# Patient Record
Sex: FEMALE | Race: WHITE | NOT HISPANIC OR LATINO | URBAN - METROPOLITAN AREA
[De-identification: names, ages, dates, MRNs, and addresses within clinical notes are randomized per-mention and may not be internally consistent; named-entity substitution may affect disease eponyms.]

---

## 2021-04-08 DIAGNOSIS — Z23 ENCOUNTER FOR IMMUNIZATION: ICD-10-CM

## 2024-01-05 ENCOUNTER — EVALUATION (OUTPATIENT)
Dept: PHYSICAL THERAPY | Facility: CLINIC | Age: 69
End: 2024-01-05
Payer: MEDICARE

## 2024-01-05 VITALS — DIASTOLIC BLOOD PRESSURE: 80 MMHG | SYSTOLIC BLOOD PRESSURE: 115 MMHG

## 2024-01-05 DIAGNOSIS — M25.572 LEFT ANKLE PAIN, UNSPECIFIED CHRONICITY: Primary | ICD-10-CM

## 2024-01-05 PROCEDURE — 97161 PT EVAL LOW COMPLEX 20 MIN: CPT

## 2024-01-05 PROCEDURE — 97110 THERAPEUTIC EXERCISES: CPT

## 2024-01-05 NOTE — PROGRESS NOTES
PT Evaluation     Today's date: 2024  Patient name: Lilian Tamayo  : 1955  MRN: [id number]  Referring provider: Suhail Mckay *  Dx:   Encounter Diagnosis     ICD-10-CM    1. Left ankle pain, unspecified chronicity  M25.572           Start Time: 0900  Stop Time: 0945  Total time in clinic (min): 45 minutes    Assessment  Assessment details: Lilian is a 69 yo male presenting to OP PT secondary to hx of L lateral malleoli avulsion fracture which occurred on 23. She is currently in lace up ankle brace and WBAT, with orders to slowly wean out with PT. Pt demonstrates L ankle AROM deficits, decreased strength, gait deviations, and balance impairments. Pt was given HEP which was completed and reviewed during treatment session. Pt would benefit from skilled PT to address impairments and improve gait mechanics as well as tolerance to PLOF. Pt is in agreement with POC.   Impairments: abnormal or restricted ROM, activity intolerance and pain with function    Symptom irritability: moderateUnderstanding of Dx/Px/POC: good   Prognosis: good    Goals  1. Pt will demonstrate a decrease pain by 50% in 4 weeks to improve tolerance with prolonged standing/walking (currently 2-9/10)   2. Pt will Increase  lower extremity strength golbally to 4/5 in 6 weeks to improve tolerance to amb >20 minutes per PLOF  3. Pt will demonstrate tolerance to maintaining SLS to 30 sec to decrease imbalance with amb  4. Pt will demonstrate 10 deg improvement in ankle DF AROM to allow for improved ease with squatting, stair climbing, and normalize gait mechanics    1. Return to Prior Level of Function in 4-6 weeks  2. Pt will demonstrate Hughes with progressive home exercise program to improve carryover and decrease recurrence of symptoms.  3. Pt will demonstrate 20% improvement in FOTO score to increase tolerance to functional activities   4. Pt will demonstrate 4+/5 in LE strength to allow for improved tolerance to  prolonged amb/standing in 6-8 weeks        Plan  Patient would benefit from: PT eval and skilled physical therapy  Planned modality interventions: TENS, manual electrical stimulation, thermotherapy: hydrocollator packs and cryotherapy  Planned therapy interventions: IASTM, joint mobilization, manual therapy, massage, patient education, neuromuscular re-education, strengthening, stretching, therapeutic activities, therapeutic exercise, balance and gait training  Frequency: 2x week  Duration in weeks: 8  Plan of Care beginning date: 2024  Plan of Care expiration date: 3/6/2024  Treatment plan discussed with: patient      Subjective Evaluation    History of Present Illness  Date of onset: 2023  Mechanism of injury: trauma  Mechanism of injury: Lilian is a 68-year-old female presenting to OP PT fracture which occurred on 23, pt was in CAM boot for 6 weeks and is currently in lace up brace until follow up with MD in 3-4 weeks. She had follow up radiograph and clinical evaluation for her left ankle lateral malleolus avulsion fracture. Per MD was instructed to and may begin transition away from the Raptor brace when she feels as though she is ready. Pt reports she is able to amb around her house however, with some pain after 1 hour before taking a break. She reports brace has been rubbing against outside of her ankle. Pt reports she has majority of her pain with prolonged standing. She reports she tries to ascend stairs normally continues to have difficulty descending due to brace.           Recurrent probem    Quality of life: good    Patient Goals  Patient goals for therapy: increased strength, independence with ADLs/IADLs and decreased pain  Patient goal: Walk without brace  Pain  Current pain ratin  At best pain ratin  At worst pain ratin  Quality: dull ache  Relieving factors: medications, rest, support and ice  Aggravating factors: standing, walking and stair climbing  Progression: no  change    Social Support  Steps to enter house: yes  Stairs in house: yes   Lives in: multiple-level home  Lives with: spouse    Employment status: not working  Hand dominance: right    Treatments  Previous treatment: medication and immobilization        Objective     Observations   Left Ankle/Foot   Positive for edema.     Palpation   Left   Tenderness of the anterior tibialis and peroneus.     Tenderness   Left Ankle/Foot   Tenderness in the anterior talofibular ligament and lateral malleolus.     Neurological Testing     Sensation     Ankle/Foot   Left Ankle/Foot   Intact: light touch    Active Range of Motion   Left Ankle/Foot   Dorsiflexion (ke): 8 degrees with pain  Plantar flexion: 29 degrees   Inversion: 10 degrees   Eversion: 10 degrees     Passive Range of Motion   Left Ankle/Foot    Dorsiflexion (ke): 10 degrees with pain  Plantar flexion: 30 degrees with pain  Inversion: 30 degrees with pain  Eversion: 20 degrees with pain    Strength/Myotome Testing     Right Ankle/Foot   Dorsiflexion: 4+  Plantar flexion: 4+  Eversion: 4+  Great toe flexion: 4+  Great toe extension: 4+    Additional Strength Details  Not assessed due to lack of full motion     Ambulation     Observational Gait   Gait: asymmetric   Decreased walking speed, left stance time and left step length.   Left foot contact pattern: foot flat      Flowsheet Rows      Flowsheet Row Most Recent Value   PT/OT G-Codes    Current Score 49   Projected Score 67               Precautions: History reviewed. No pertinent past medical history.      Date 1/05           Visit # 1           FOTO done           Re-eval                  Manuals 1/05                                                                Neuro Re-Ed             Foam WS             Rocker board A/P, M/L             SLS             BAPs                                                     Ther Ex 1/05            Bike             Ankle AROM DF/PF, EV/INV 2x10            Ankle ABCs              4 way cristal             Ankle circles X10 HEP            Seated HR/TR             Arch raises             Short foot             Standing SLR 3 way              CC EV/INV             Ther Activity             Step ups             Squat             Gait Training                                       Modalities

## 2024-01-05 NOTE — LETTER
2024      No Recipients    Patient: Lilian Tamayo   YOB: 1955   Date of Visit: 2024     Encounter Diagnosis     ICD-10-CM    1. Left ankle pain, unspecified chronicity  M25.572           Dear Dr. Mckay:    Thank you for your recent referral of Lilian Tamayo. Please review the attached evaluation summary from Lilian's recent visit.     Please verify that you agree with the plan of care by signing the attached order.     If you have any questions or concerns, please do not hesitate to call.     I sincerely appreciate the opportunity to share in the care of one of your patients and hope to have another opportunity to work with you in the near future.       Sincerely,    Estephanie Schulte, PT      Referring Provider:      I certify that I have read the below Plan of Care and certify the need for these services furnished under this plan of treatment while under my care.                    Suhail Mckay II, PA-C  [address] [place] [address]  Via Fax: [phone]          PT Evaluation     Today's date: 2024  Patient name: Lilian Tamayo  : 1955  MRN: [id number]  Referring provider: Suhail Mckay *  Dx:   Encounter Diagnosis     ICD-10-CM    1. Left ankle pain, unspecified chronicity  M25.572           Start Time: 0900  Stop Time: 0945  Total time in clinic (min): 45 minutes    Assessment  Assessment details: Lilian is a 69 yo male presenting to OP PT   Impairments: abnormal or restricted ROM, activity intolerance and pain with function    Symptom irritability: moderateUnderstanding of Dx/Px/POC: good   Prognosis: good    Goals  1. Pt will demonstrate a decrease pain by 50% in 4 weeks to improve tolerance with prolonged standing/walking  2. Pt will Increase  lower extremity strength golbally to 4/5 in 6 weeks to improve tolerance to amb >20 minutes per PLOF  3. Pt will demonstrate tolerance to maintaining SLS to 30 sec to decrease imbalance with  amb  4. Pt will demonstrate 10 deg improvement in ankle DF AROM to allow for improved ease with squatting, stair climbing, and normalize gait mechanics    1. Return to Prior Level of Function in 4-6 weeks  2. Pt will demonstrate Cook with progressive home exercise program to improve carryover and decrease recurrence of symptoms.  3. Pt will demonstrate 20% improvement in FOTO score to increase tolerance to functional activities   4. Pt will demonstrate 4+/5 in LE strength to allow for improved tolerance to prolonged amb/standing in 6-8 weeks        Plan  Patient would benefit from: PT eval and skilled physical therapy  Planned modality interventions: TENS, manual electrical stimulation, thermotherapy: hydrocollator packs and cryotherapy  Planned therapy interventions: IASTM, joint mobilization, manual therapy, massage, patient education, neuromuscular re-education, strengthening, stretching, therapeutic activities, therapeutic exercise, balance and gait training  Frequency: 2x week  Duration in weeks: 8  Plan of Care beginning date: 1/5/2024  Plan of Care expiration date: 3/6/2024  Treatment plan discussed with: patient    Subjective Evaluation    History of Present Illness  Date of onset: 11/19/2023  Mechanism of injury: trauma  Mechanism of injury: Lilian is a 68-year-old female presenting to OP PT fracture which occurred on 11/19/23, pt was in CAM boot for 6 weeks and is currently in lace up brace until follow up with MD in 3-4 weeks. She had follow up radiograph and clinical evaluation for her left ankle lateral malleolus avulsion fracture. Per MD was instructed to and may begin transition away from the Raptor brace when she feels as though she is ready. Pt reports she is able to amb around her house however, with some pain after 1 hour before taking a break. She reports brace has been rubbing against outside of her ankle. Pt reports she has majority of her pain with prolonged standing. She reports she  tries to ascend stairs normally continues to have difficulty descending due to brace.           Recurrent probem    Quality of life: good    Patient Goals  Patient goals for therapy: increased strength, independence with ADLs/IADLs and decreased pain  Patient goal: Walk without brace  Pain  Current pain ratin  At best pain ratin  At worst pain ratin  Quality: dull ache  Relieving factors: medications, rest, support and ice  Aggravating factors: standing, walking and stair climbing  Progression: no change    Social Support  Steps to enter house: yes  Stairs in house: yes   Lives in: multiple-level home  Lives with: spouse    Employment status: not working  Hand dominance: right    Treatments  Previous treatment: medication and immobilization      Objective     Observations   Left Ankle/Foot   Positive for edema.     Palpation   Left   Tenderness of the anterior tibialis and peroneus.     Tenderness   Left Ankle/Foot   Tenderness in the anterior talofibular ligament and lateral malleolus.     Neurological Testing     Sensation     Ankle/Foot   Left Ankle/Foot   Intact: light touch    Active Range of Motion   Left Ankle/Foot   Dorsiflexion (ke): 8 degrees with pain  Plantar flexion: 29 degrees   Inversion: 10 degrees   Eversion: 10 degrees     Passive Range of Motion   Left Ankle/Foot    Dorsiflexion (ke): 10 degrees with pain  Plantar flexion: 30 degrees with pain  Inversion: 30 degrees with pain  Eversion: 20 degrees with pain    Strength/Myotome Testing     Right Ankle/Foot   Dorsiflexion: 4+  Plantar flexion: 4+  Eversion: 4+  Great toe flexion: 4+  Great toe extension: 4+    Additional Strength Details  Not assessed due to lack of full motion     Ambulation     Observational Gait   Gait: asymmetric   Decreased walking speed, left stance time and left step length.   Left foot contact pattern: foot flat      Flowsheet Rows      Flowsheet Row Most Recent Value   PT/OT G-Codes    Current Score 49    Projected Score 67               Precautions: History reviewed. No pertinent past medical history.      Date 1/05           Visit # 1           FOTO done           Re-eval                  Manuals 1/05                                                                Neuro Re-Ed             Foam WS             Rocker board A/P, M/L             SLS             BAPs                                                     Ther Ex 1/05            Bike             Ankle AROM DF/PF, EV/INV 2x10            Ankle ABCs             4 way cristal             Ankle circles X10 HEP            Seated HR/TR             Arch raises             Short foot             Standing SLR 3 way              CC EV/INV             Ther Activity             Step ups             Squat             Gait Training                                       Modalities

## 2024-01-08 ENCOUNTER — OFFICE VISIT (OUTPATIENT)
Dept: PHYSICAL THERAPY | Facility: CLINIC | Age: 69
End: 2024-01-08
Payer: MEDICARE

## 2024-01-08 DIAGNOSIS — M25.572 LEFT ANKLE PAIN, UNSPECIFIED CHRONICITY: Primary | ICD-10-CM

## 2024-01-08 PROCEDURE — 97110 THERAPEUTIC EXERCISES: CPT

## 2024-01-08 PROCEDURE — 97140 MANUAL THERAPY 1/> REGIONS: CPT

## 2024-01-08 NOTE — PROGRESS NOTES
Daily Note     Today's date: 2024  Patient name: Lilian Tamayo  : 1955  MRN: [id number]  Referring provider: Suhail Mckay *  Dx:   Encounter Diagnosis     ICD-10-CM    1. Left ankle pain, unspecified chronicity  M25.572           Start Time: 725  Stop Time: 0808  Total time in clinic (min): 43 minutes    Subjective: pt reports mild elevation in ankle soreness with exercises, she verbalizes increase skin sensitivity around lateral malleoli. Pt denies any medical updates since her las session.       Objective: See treatment diary below      Assessment: Tolerated treatment well. Pt was introduced to standing exercises and intrinsic muscle strengthening this session. She reports some soreness with EV/INV ROM exercises but is able to complete. Patient exhibited good technique with therapeutic exercises and would benefit from continued PT      Plan: Continue per plan of care.      Precautions: History reviewed.     Date           Visit # 1           FOTO done           Re-eval                  Manuals            Ankle PROM to tolerance                                                    Neuro Re-Ed             Foam WS  1x10 A/P. M/L   1x10 SL            Rocker board A/P, M/L             SLS             BAPs                                                     Ther Ex            Bike             Ankle AROM DF/PF, EV/INV 2x10 2x10 ea           Ankle ABCs  1x ea           4 way cristal  MRE 5''x10 ea           Ankle circles X10 HEP 1x15           Supine   SLR 2x10            Seated HR/TR  1x15           Arch raises  1x15           Short foot             Standing SLR 3 way   1x10            Gastroc stretch   5x10''           CC EV/INV  1x10           Ther Activity             Step ups             Squat             Gait Training                                       Modalities

## 2024-01-12 ENCOUNTER — OFFICE VISIT (OUTPATIENT)
Dept: PHYSICAL THERAPY | Facility: CLINIC | Age: 69
End: 2024-01-12
Payer: MEDICARE

## 2024-01-12 DIAGNOSIS — M25.572 LEFT ANKLE PAIN, UNSPECIFIED CHRONICITY: Primary | ICD-10-CM

## 2024-01-12 PROCEDURE — 97140 MANUAL THERAPY 1/> REGIONS: CPT

## 2024-01-12 PROCEDURE — 97110 THERAPEUTIC EXERCISES: CPT

## 2024-01-12 PROCEDURE — 97112 NEUROMUSCULAR REEDUCATION: CPT

## 2024-01-12 NOTE — PROGRESS NOTES
Daily Note     Today's date: 2024  Patient name: Lilian Tamayo  : 1955  MRN: [id number]  Referring provider: Suhail Mckay *  Dx:   Encounter Diagnosis     ICD-10-CM    1. Left ankle pain, unspecified chronicity  M25.572           Start Time: 1250  Stop Time: 1330  Total time in clinic (min): 40 minutes    Subjective: Pt reports ankle is more sore at the start of her session due to putting away krys decorations. She denies any medical changes since her last session.        Objective: See treatment diary below      Assessment:  Lilian tolerated treatment well with consistent cuing throughout. TE's were performed with the same resistance and reps. New TE's were demonstrated with proper technique, and tolerated well. Following treatment, the patient demonstrated fatigue post treatment, exhibited good technique with therapeutic exercises, and would benefit from continued PT.         Plan: Continue per plan of care.      Precautions: History reviewed.     Date          Visit # 1           FOTO done           Re-eval                  Manuals           Ankle PROM to tolerance  FH FH          STM to lateral ankle   FH                                    Neuro Re-Ed             Foam WS  1x10 A/P. M/L   1x10 SL  1x10 w/ brace          Rocker board A/P, M/L             SLS             BAPs              Foam steps   Fwd/lat WS 1x10          Mini squats   1x10 on foam                       Ther Ex           Bike             Ankle AROM DF/PF, EV/INV 2x10 2x10 ea 3x10          Ankle ABCs  1x ea 1x          4 way cristal  MRE 5''x10 ea 5''x15          Ankle circles X10 HEP 1x15 2x10          Supine   SLR 2x10  SLR 2x10          Seated HR/TR  1x15 1x15 Inc 2x10         Arch raises  1x15 5''x15          Short foot             Standing SLR 3 way   1x10  1x10          Gastroc stretch   5x10'' 5x10''          CC EV/INV  1x10 1x10          Ther Activity             Step  ups             Squat             Gait Training                                       Modalities

## 2024-01-15 ENCOUNTER — OFFICE VISIT (OUTPATIENT)
Dept: PHYSICAL THERAPY | Facility: CLINIC | Age: 69
End: 2024-01-15
Payer: MEDICARE

## 2024-01-15 DIAGNOSIS — M25.572 LEFT ANKLE PAIN, UNSPECIFIED CHRONICITY: Primary | ICD-10-CM

## 2024-01-15 PROCEDURE — 97112 NEUROMUSCULAR REEDUCATION: CPT

## 2024-01-15 PROCEDURE — 97140 MANUAL THERAPY 1/> REGIONS: CPT

## 2024-01-15 PROCEDURE — 97110 THERAPEUTIC EXERCISES: CPT

## 2024-01-15 NOTE — PROGRESS NOTES
Daily Note     Today's date: 1/15/2024  Patient name: Lilian Tamaoy  : 1955  MRN: [id number]  Referring provider: Suhail Mckay *  Dx:   Encounter Diagnosis     ICD-10-CM    1. Left ankle pain, unspecified chronicity  M25.572           Start Time: 0730  Stop Time: 0810  Total time in clinic (min): 40 minutes    Subjective: Pt reports soreness which she notes is typical when she is on her feet for extended periods of time.      Objective: See treatment diary below      Assessment: Lilian tolerates treatment well. She is introduced to theraband restistance for 4 way ankle strengthening with good tolerance. Exercise reps were increased to further challenge patient. She was additionally introduced to gentle DF mobilization with movement off 4inch step with good tolernace and demonstration of increase mobility. She reports mild elevation in soreness, but denies significant discomfort. Pt was educated to ice and elevate to assist if soreness persists later in the day.       Plan: Continue per plan of care.      Precautions: History reviewed.     Date 1/05 1/08 1/12 1/15        Visit # 1           FOTO done           Re-eval                  Manuals 1/05 1/08 1/12 1/15         Ankle PROM to tolerance  FH FH FH         STM to lateral ankle   FH FH                                   Neuro Re-Ed             Foam WS  1x10 A/P. M/L   1x10 SL  1x10 w/ brace W/o brace 2x10         Rocker board A/P, M/L             SLS             BAPs              Foam steps   Fwd/lat WS 1x10 W/o brace 2x10         Mini squats   1x10 on foam 1x10 on foam w/o brace                      Ther Ex 1/05 1/08 1/12 1/15         Bike             Ankle AROM DF/PF, EV/INV 2x10 2x10 ea 3x10 3x10 ea DC to HEP         Ankle ABCs  1x ea 1x 1x         4 way cristal  MRE 5''x10 ea 5''x15 5''x15 MRE Tband GTB 2x10         Ankle circles X10 HEP 1x15 2x10 2x10         Supine SLR  SLR 2x10  SLR 2x10 2x10 SLR         Seated HR/TR  1x15 1x15  2x10          Arch raises  1x15 5''x15 2x10         Short foot             Standing SLR 3 way   1x10  1x10 On foam 1x15 ea         Gastroc stretch   5x10'' 5x10'' 3x15''         CC EV/INV  1x10 1x10 2x10 ea         Ther Activity             Step ups    Step overs with foam 1x15         Squat             Gait Training                                       Modalities

## 2024-01-19 ENCOUNTER — APPOINTMENT (OUTPATIENT)
Dept: PHYSICAL THERAPY | Facility: CLINIC | Age: 69
End: 2024-01-19
Payer: MEDICARE

## 2024-01-22 ENCOUNTER — OFFICE VISIT (OUTPATIENT)
Dept: PHYSICAL THERAPY | Facility: CLINIC | Age: 69
End: 2024-01-22
Payer: MEDICARE

## 2024-01-22 DIAGNOSIS — M25.572 LEFT ANKLE PAIN, UNSPECIFIED CHRONICITY: Primary | ICD-10-CM

## 2024-01-22 PROCEDURE — 97112 NEUROMUSCULAR REEDUCATION: CPT

## 2024-01-22 PROCEDURE — 97110 THERAPEUTIC EXERCISES: CPT

## 2024-01-22 PROCEDURE — 97140 MANUAL THERAPY 1/> REGIONS: CPT

## 2024-01-22 NOTE — PROGRESS NOTES
"Daily Note     Today's date: 2024  Patient name: Lilian Tamayo  : 1955  MRN: [id number]  Referring provider: Suhail Mckay *  Dx:   Encounter Diagnosis     ICD-10-CM    1. Left ankle pain, unspecified chronicity  M25.572                      Subjective: Patient reports left ankle is feeling better overall.  Continues to note difficulty with descending stairs .       Objective: See treatment diary below      Assessment: Lilian tolerates treatment well. Patient was able to progress with balance and proprioception activities with good tolerance.  Patient demonstrates improve ankle, hip and knee strategy with SLS activities.  Patient reports fatigue following exercises.  Patient appropriately challenged with SLS on FOAM.        Plan: Continue per plan of care.      Precautions: History reviewed.     Date 1/05 1/08 1/12 1/15 1/22       Visit # 1           FOTO done           Re-eval                  Manuals 1/05 1/08 1/12 1/15 1/22        Ankle PROM to tolerance  FH FH FH ksg        STM to lateral ankle   FH FH ksg                                  Neuro Re-Ed             Foam WS  1x10 A/P. M/L   1x10 SL  1x10 w/ brace W/o brace 2x10 W/O brace 2x10        Rocker board A/P, M/L     X20 ea        SLS     On foam 30\" x3        BAPs              Foam steps   Fwd/lat WS 1x10 W/o brace 2x10 W/O brace 2x10        Mini squats   1x10 on foam 1x10 on foam w/o brace 1x10 on foam w/o brace                     Ther Ex 1/05 1/08 1/12 1/15 1/22        Bike             Ankle AROM DF/PF, EV/INV 2x10 2x10 ea 3x10 3x10 ea DC to HEP HEP        Ankle ABCs  1x ea 1x 1x 1x        4 way cristal  MRE 5''x10 ea 5''x15 5''x15 MRE Tband GTB 2x10 GTB 2x10 ea        Ankle circles X10 HEP 1x15 2x10 2x10 2x10        Supine SLR  SLR 2x10  SLR 2x10 2x10 SLR 2x10 SLR        Seated HR/TR  1x15 1x15  2x10 2x10        Arch raises  1x15 5''x15 2x10  5\" 2x10        Short foot     5\" 2x10        Standing SLR 3 way   1x10  1x10 On foam 1x15 " "ea On foam 1x15 ea        Gastroc stretch   5x10'' 5x10'' 3x15'' 20\" x5 w/ strap        Gastroc/soleus stretch on 1/2 foam roll in standing     15\" x5 gastroc  5\" x10 soleus                                  CC EV/INV  1x10 1x10 2x10 ea 2x10 ea        Ther Activity             Step ups    Step overs with foam 1x15 Step over with foam 1x15    6\" step up  FW/LAT  X15 ea.           Squat             Gait Training                                       Modalities                                                    "

## 2024-01-26 ENCOUNTER — OFFICE VISIT (OUTPATIENT)
Dept: PHYSICAL THERAPY | Facility: CLINIC | Age: 69
End: 2024-01-26
Payer: MEDICARE

## 2024-01-26 DIAGNOSIS — M25.572 LEFT ANKLE PAIN, UNSPECIFIED CHRONICITY: Primary | ICD-10-CM

## 2024-01-26 PROCEDURE — 97112 NEUROMUSCULAR REEDUCATION: CPT

## 2024-01-26 PROCEDURE — 97140 MANUAL THERAPY 1/> REGIONS: CPT

## 2024-01-26 PROCEDURE — 97110 THERAPEUTIC EXERCISES: CPT

## 2024-01-26 NOTE — PROGRESS NOTES
"Daily Note     Today's date: 2024  Patient name: Lilian Tamayo  : 1955  MRN: [id number]  Referring provider: Suhail Mckay *  Dx:   Encounter Diagnosis     ICD-10-CM    1. Left ankle pain, unspecified chronicity  M25.572           Start Time: 1245  Stop Time: 1339  Total time in clinic (min): 54 minutes    Subjective: Pt reports follow up with orthopedic MD yesterday with instruction to continue PT and begin amb without brace as tolerated. She reports she has been feeling good progress with PT overall and notable improvement   with stair climbing etc.       Objective: See treatment diary below      Assessment:  tolerated treatment well with consistent cuing throughout. TE's were performed with the same resistance and reps. New TE's were demonstrated with proper technique, and tolerated well. Following treatment, the patient demonstrated fatigue post treatment, exhibited good technique with therapeutic exercises, and would benefit from continued PT.         Plan: Continue per plan of care.      Precautions: History reviewed.     Date 1/05 1/08 1/12 1/15 1/22 1/26        Visit # 1             FOTO done     X        Re-eval                    Manuals 1/05 1/08 1/12 1/15 1/22 1/26       Ankle PROM to tolerance  FH FH FH ksg FH       STM to lateral ankle   FH FH ksg FH                                 Neuro Re-Ed             Foam WS  1x10 A/P. M/L   1x10 SL  1x10 w/ brace W/o brace 2x10 W/O brace 2x10 NP       Rocker board A/P, M/L     X20 ea NV       SLS     On foam 30\" x3 30''x3 on foam       BAPs              Foam steps   Fwd/lat WS 1x10 W/o brace 2x10 W/O brace 2x10 regular       Mini squats   1x10 on foam 1x10 on foam w/o brace 1x10 on foam w/o brace 2x10                    Ther Ex 1/05 1/08 1/12 1/15 1/22 1/26       Bike             Ankle AROM DF/PF, EV/INV 2x10 2x10 ea 3x10 3x10 ea DC to HEP HEP        Ankle ABCs  1x ea 1x 1x 1x DC HEP       4 way cristal  MRE 5''x10 ea 5''x15 5''x15 MRE Tband " "GTB 2x10 GTB 2x10 ea GTB 3x10 ea        Ankle circles X10 HEP 1x15 2x10 2x10 2x10 Wobble board all direction 2x10       Supine SLR  SLR 2x10  SLR 2x10 2x10 SLR 2x10 SLR 3x10 SLR S/L NV       Seated HR/TR  1x15 1x15  2x10 2x10 Standing 2x10       Arch raises  1x15 5''x15 2x10  5\" 2x10 Aleida standing 2x10       Short foot     5\" 2x10 NV       Standing SLR 3 way   1x10  1x10 On foam 1x15 ea On foam 1x15 ea 2x10 ea       Gastroc stretch   5x10'' 5x10'' 3x15'' 20\" x5 w/ strap NV       Gastroc/soleus stretch on 1/2 foam roll in standing     15\" x5 gastroc  5\" x10 soleus NV                                 CC EV/INV  1x10 1x10 2x10 ea 2x10 ea 2x10 3#        Ther Activity             Step ups    Step overs with foam 1x15 Step over with foam 1x15    6\" step up  FW/LAT  X15 ea.    6'' fwd/dwn/lat 2x10       Squat             Gait Training                                       Modalities                                                      "

## 2024-01-30 ENCOUNTER — EVALUATION (OUTPATIENT)
Dept: PHYSICAL THERAPY | Facility: CLINIC | Age: 69
End: 2024-01-30
Payer: MEDICARE

## 2024-01-30 DIAGNOSIS — M25.572 LEFT ANKLE PAIN, UNSPECIFIED CHRONICITY: Primary | ICD-10-CM

## 2024-01-30 PROCEDURE — 97140 MANUAL THERAPY 1/> REGIONS: CPT

## 2024-01-30 PROCEDURE — 97110 THERAPEUTIC EXERCISES: CPT

## 2024-01-30 NOTE — PROGRESS NOTES
PT Re-Evaluation     Today's date: 2024  Patient name: Lilian Tamayo  : 1955  MRN: [id number]  Referring provider: Suhail Mckay *  Dx:   Encounter Diagnosis     ICD-10-CM    1. Left ankle pain, unspecified chronicity  M25.572           Start Time: 1000  Stop Time: 1045  Total time in clinic (min): 45 minutes      Assessment  Assessment details: Lilian is a 67 yo male presenting to OP PT secondary to  RE of L lateral malleoli avulsion fracture which occurred on 23. Pt is amb without lace up brace, about 10 minutes before onset of soreness from sneaker rubing agianst lateral malleoli. PT taped along this region for extra support in shoe and pt notes immediate relief. Pt demonstrates sig strength and ankle AROM gains compared to Initial evaluation. She continues to present with mild balance deficits, PF AROM deficits, and EV/iNV strength deficits. Pt would benefit from continued therapy to further progress gains and increase tolerance to daily activities. Pt would benefit from skilled PT to address impairments and improve gait mechanics as well as tolerance to PLOF. Pt is in agreement with POC.   Impairments: abnormal or restricted ROM, activity intolerance and pain with function    Symptom irritability: moderateUnderstanding of Dx/Px/POC: good   Prognosis: good    Goals  1. Pt will demonstrate a decrease pain by 50% in 4 weeks to improve tolerance with prolonged standing/walking (MET)   2. Pt will Increase  lower extremity strength [employer] to 4/5 in 6 weeks to improve tolerance to amb >20 minutes per PLOF (10-15 minutes)  3. Pt will demonstrate tolerance to maintaining SLS to 30 sec to decrease imbalance with amb (improved to 25 sec)  4. Pt will demonstrate 10 deg improvement in ankle DF AROM to allow for improved ease with squatting, stair climbing, and normalize gait mechanics (MET)    1. Return to Prior Level of Function in 4-6 weeks (progress)  2. Pt will demonstrate [place]  with progressive home exercise program to improve carryover and decrease recurrence of symptoms. (Progress)  3. Pt will demonstrate 20% improvement in FOTO score to increase tolerance to functional activities (progress)  4. Pt will demonstrate 4+/5 in LE strength to allow for improved tolerance to prolonged amb/standing in 6-8 weeks (progress)        Plan  Patient would benefit from: PT eval and skilled physical therapy  Planned modality interventions: TENS, manual electrical stimulation, thermotherapy: hydrocollator packs and cryotherapy  Planned therapy interventions: IASTM, joint mobilization, manual therapy, massage, patient education, neuromuscular re-education, strengthening, stretching, therapeutic activities, therapeutic exercise, balance and gait training  Frequency: 2x week  Duration in weeks: 8  Plan of Care beginning date: 1/30/2024  Plan of Care expiration date: 3/16/2024  Treatment plan discussed with: patient      Subjective Evaluation  RE:  1/30/24   Pt reports 70% improvement since beginning therapy. She is able to negotiate steps non-reciprocally and notes overall increased tolerance to daily activities. She has been slowly weaning out of her lace up brace, with only discomfort noted along lateral maleoli, tolerance about 10-15 minutes before onset of pain. Pt goals going forward include to be able to walk 1-3 miles without brace.      History of Present Illness  Date of onset: 11/19/2023  Mechanism of injury: trauma  Mechanism of injury: Lilian is a 68-year-old female presenting to OP PT fracture which occurred on 11/19/23, pt was in CAM boot for 6 weeks and is currently in lace up brace until follow up with MD in 3-4 weeks. She had follow up radiograph and clinical evaluation for her left ankle lateral malleolus avulsion fracture. Per MD was instructed to and may begin transition away from the Raptor brace when she feels as though she is ready. Pt reports she is able to amb around her house  however, with some pain after 1 hour before taking a break. She reports brace has been rubbing against outside of her ankle. Pt reports she has majority of her pain with prolonged standing. She reports she tries to ascend stairs normally continues to have difficulty descending due to brace.           Recurrent probem    Quality of life: good    Patient Goals  Patient goals for therapy: increased strength, independence with ADLs/IADLs and decreased pain  Patient goal: Walk without brace  Pain  Current pain ratin  At best pain ratin  At worst pain ratin  Quality: dull ache  Relieving factors: medications, rest, support and ice  Aggravating factors: standing, walking and stair climbing  Progression: no change    Social Support  Steps to enter house: yes  Stairs in house: yes   Lives in: multiple-level home  Lives with: spouse    Employment status: not working  Hand dominance: right    Treatments  Previous treatment: medication and immobilization        Objective     Observations   Left Ankle/Foot   Positive for edema.     Palpation   Left   Tenderness of the anterior tibialis and peroneus.     Tenderness   Left Ankle/Foot   Tenderness in the anterior talofibular ligament and lateral malleolus.     Neurological Testing     Sensation     Ankle/Foot   Left Ankle/Foot   Intact: light touch    Active Range of Motion   Left Ankle/Foot   Dorsiflexion (ke): 23 degrees with pain  Plantar flexion: 40 degrees   Inversion: 25 degrees   Eversion: 20 degrees     Passive Range of Motion   Left Ankle/Foot    Dorsiflexion (ke): 23 degrees with pain  Plantar flexion: 40 degrees with pain  Inversion: 30 degrees with pain  Eversion: 20 degrees with pain    Strength/Myotome Testing   Left ankle  DF 4/5  PF 4/5  Ev 4/5  Inv 4-/5  Great toe flex 4+  Great toe ext 4+    Right Ankle/Foot   Dorsiflexion: 4+  Plantar flexion: 4+  Eversion: 4+  Inversion 4+  Great toe flexion: 4+  Great toe extension: 4+    Additional Strength  "Details  Not assessed due to lack of full motion     Ambulation     Observational Gait   Gait: asymmetric   Decreased walking speed, left stance time and left step length.   Left foot contact pattern: foot flat               Date 1/05 1/08 1/12 1/15 1/22 1/26 1/30   Visit # 1      7   FOTO done     X    Re-eval       X         Manuals 1/05 1/08 1/12 1/15 1/22 1/26 1/30      Ankle PROM to tolerance  FH FH FH ksg FH FH      STM to lateral ankle   FH FH ksg FH FH KT trial this session for Lateral maleoli supprt                                Neuro Re-Ed             Foam WS  1x10 A/P. M/L   1x10 SL  1x10 w/ brace W/o brace 2x10 W/O brace 2x10 NP DC      Rocker board A/P, M/L     X20 ea NV 2x10 ea      SLS     On foam 30\" x3 30''x3 on foam 30''x3 on foam      BAPs              Foam steps   Fwd/lat WS 1x10 W/o brace 2x10 W/O brace 2x10 regular DC      Mini squats   1x10 on foam 1x10 on foam w/o brace 1x10 on foam w/o brace 2x10 2x10                   Ther Ex 1/05 1/08 1/12 1/15 1/22 1/26 1/30      Bike             Ankle AROM DF/PF, EV/INV 2x10 2x10 ea 3x10 3x10 ea DC to HEP HEP        Ankle ABCs  1x ea 1x 1x 1x DC HEP       4 way aleida  MRE 5''x10 ea 5''x15 5''x15 MRE Tband GTB 2x10 GTB 2x10 ea GTB 3x10 ea  NV OOT      Ankle circles X10 HEP 1x15 2x10 2x10 2x10 Wobble board all direction 2x10 Wobble board all direction 2x10 standing      Supine SLR  SLR 2x10  SLR 2x10 2x10 SLR 2x10 SLR 3x10 SLR S/L NV NV      Seated HR/TR  1x15 1x15  2x10 2x10 Standing 2x10 2x10       Arch raises  1x15 5''x15 2x10  5\" 2x10 Aleida standing 2x10 NV OOT      Short foot     5\" 2x10 NV       Standing SLR 3 way   1x10  1x10 On foam 1x15 ea On foam 1x15 ea 2x10 ea NV OOT      Gastroc stretch   5x10'' 5x10'' 3x15'' 20\" x5 w/ strap NV HEP      Gastroc/soleus stretch on 1/2 foam roll in standing     15\" x5 gastroc  5\" x10 soleus NV 30''x3                                CC EV/INV  1x10 1x10 2x10 ea 2x10 ea 2x10 3#  NV OOT      Ther Activity           " "  Step ups    Step overs with foam 1x15 Step over with foam 1x15    6\" step up  FW/LAT  X15 ea.    6'' fwd/dwn/lat 2x10 6'' fwd/dwn/lat 2x10 overs      Squat             Gait Training                                       Modalities                                            "

## 2024-01-30 NOTE — LETTER
2024      No Recipients    Patient: Lilian Tamayo   YOB: 1955   Date of Visit: 2024     Encounter Diagnosis     ICD-10-CM    1. Left ankle pain, unspecified chronicity  M25.572           Dear Dr. Mckay:    Thank you for your recent referral of Lilian Tamayo. Please review the attached evaluation summary from Lilian's recent visit.     Please verify that you agree with the plan of care by signing the attached order.     If you have any questions or concerns, please do not hesitate to call.     I sincerely appreciate the opportunity to share in the care of one of your patients and hope to have another opportunity to work with you in the near future.       Sincerely,    Estephanie Schulte, PT      Referring Provider:      I certify that I have read the below Plan of Care and certify the need for these services furnished under this plan of treatment while under my care.                    Suhail Mckay II, PA-C  [address] [place] [address]  Via Fax: [phone]          PT Re-Evaluation     Today's date: 2024  Patient name: Lilian Tamayo  : 1955  MRN: [id number]  Referring provider: Suhail Mckay *  Dx:   Encounter Diagnosis     ICD-10-CM    1. Left ankle pain, unspecified chronicity  M25.572           Start Time: 1000  Stop Time: 1045  Total time in clinic (min): 45 minutes      Assessment  Assessment details: Lilian is a 69 yo male presenting to OP PT secondary to  RE of L lateral malleoli avulsion fracture which occurred on 23. Pt is amb without lace up brace, about 10 minutes before onset of soreness from sneaker rubing agianst lateral malleoli. PT taped along this region for extra support in shoe and pt notes immediate relief. Pt demonstrates sig strength and ankle AROM gains compared to Initial evaluation. She continues to present with mild balance deficits, PF AROM deficits, and EV/iNV strength deficits. Pt would benefit from  continued therapy to further progress gains and increase tolerance to daily activities. Pt would benefit from skilled PT to address impairments and improve gait mechanics as well as tolerance to PLOF. Pt is in agreement with POC.   Impairments: abnormal or restricted ROM, activity intolerance and pain with function    Symptom irritability: moderateUnderstanding of Dx/Px/POC: good   Prognosis: good    Goals  1. Pt will demonstrate a decrease pain by 50% in 4 weeks to improve tolerance with prolonged standing/walking (MET)   2. Pt will Increase  lower extremity strength [place] to 4/5 in 6 weeks to improve tolerance to amb >20 minutes per PLOF (10-15 minutes)  3. Pt will demonstrate tolerance to maintaining SLS to 30 sec to decrease imbalance with amb (improved to 25 sec)  4. Pt will demonstrate 10 deg improvement in ankle DF AROM to allow for improved ease with squatting, stair climbing, and normalize gait mechanics (MET)    1. Return to Prior Level of Function in 4-6 weeks (progress)  2. Pt will demonstrate [place] with progressive home exercise program to improve carryover and decrease recurrence of symptoms. (Progress)  3. Pt will demonstrate 20% improvement in FOTO score to increase tolerance to functional activities (progress)  4. Pt will demonstrate 4+/5 in LE strength to allow for improved tolerance to prolonged amb/standing in 6-8 weeks (progress)        Plan  Patient would benefit from: PT eval and skilled physical therapy  Planned modality interventions: TENS, manual electrical stimulation, thermotherapy: hydrocollator packs and cryotherapy  Planned therapy interventions: IASTM, joint mobilization, manual therapy, massage, patient education, neuromuscular re-education, strengthening, stretching, therapeutic activities, therapeutic exercise, balance and gait training  Frequency: 2x week  Duration in weeks: 8  Plan of Care beginning date: 1/30/2024  Plan of Care expiration date: 3/16/2024  Treatment  plan discussed with: patient      Subjective Evaluation  RE:  24   Pt reports 70% improvement since beginning therapy. She is able to negotiate steps non-reciprocally and notes overall increased tolerance to daily activities. She has been slowly weaning out of her lace up brace, with only discomfort noted along lateral maleoli, tolerance about 10-15 minutes before onset of pain. Pt goals going forward include to be able to walk 1-3 miles without brace.      History of Present Illness  Date of onset: 2023  Mechanism of injury: trauma  Mechanism of injury: Lilian is a 68-year-old female presenting to OP PT fracture which occurred on 23, pt was in CAM boot for 6 weeks and is currently in lace up brace until follow up with MD in 3-4 weeks. She had follow up radiograph and clinical evaluation for her left ankle lateral malleolus avulsion fracture. Per MD was instructed to and may begin transition away from the Raptor brace when she feels as though she is ready. Pt reports she is able to amb around her house however, with some pain after 1 hour before taking a break. She reports brace has been rubbing against outside of her ankle. Pt reports she has majority of her pain with prolonged standing. She reports she tries to ascend stairs normally continues to have difficulty descending due to brace.           Recurrent probem    Quality of life: good    Patient Goals  Patient goals for therapy: increased strength, independence with ADLs/IADLs and decreased pain  Patient goal: Walk without brace  Pain  Current pain ratin  At best pain ratin  At worst pain ratin  Quality: dull ache  Relieving factors: medications, rest, support and ice  Aggravating factors: standing, walking and stair climbing  Progression: no change    Social Support  Steps to enter house: yes  Stairs in house: yes   Lives in: multiple-level home  Lives with: spouse    Employment status: not working  Hand dominance:  "right    Treatments  Previous treatment: medication and immobilization        Objective     Observations   Left Ankle/Foot   Positive for edema.     Palpation   Left   Tenderness of the anterior tibialis and peroneus.     Tenderness   Left Ankle/Foot   Tenderness in the anterior talofibular ligament and lateral malleolus.     Neurological Testing     Sensation     Ankle/Foot   Left Ankle/Foot   Intact: light touch    Active Range of Motion   Left Ankle/Foot   Dorsiflexion (ke): 23 degrees with pain  Plantar flexion: 40 degrees   Inversion: 25 degrees   Eversion: 20 degrees     Passive Range of Motion   Left Ankle/Foot    Dorsiflexion (ke): 23 degrees with pain  Plantar flexion: 40 degrees with pain  Inversion: 30 degrees with pain  Eversion: 20 degrees with pain    Strength/Myotome Testing   Left ankle  DF 4/5  PF 4/5  Ev 4/5  Inv 4-/5  Great toe flex 4+  Great toe ext 4+    Right Ankle/Foot   Dorsiflexion: 4+  Plantar flexion: 4+  Eversion: 4+  Inversion 4+  Great toe flexion: 4+  Great toe extension: 4+    Additional Strength Details  Not assessed due to lack of full motion     Ambulation     Observational Gait   Gait: asymmetric   Decreased walking speed, left stance time and left step length.   Left foot contact pattern: foot flat               Date 1/05 1/08 1/12 1/15 1/22 1/26 1/30   Visit # 1      7   FOTO done     X    Re-eval       X         Manuals 1/05 1/08 1/12 1/15 1/22 1/26 1/30      Ankle PROM to tolerance  FH FH FH ksg FH FH      STM to lateral ankle   FH FH ksg FH FH KT trial this session for Lateral maleoli supprt                                Neuro Re-Ed             Foam WS  1x10 A/P. M/L   1x10 SL  1x10 w/ brace W/o brace 2x10 W/O brace 2x10 NP DC      Rocker board A/P, M/L     X20 ea NV 2x10 ea      SLS     On foam 30\" x3 30''x3 on foam 30''x3 on foam      BAPs              Foam steps   Fwd/lat WS 1x10 W/o brace 2x10 W/O brace 2x10 regular DC      Mini squats   1x10 on foam 1x10 on foam w/o " "brace 1x10 on foam w/o brace 2x10 2x10                   Ther Ex 1/05 1/08 1/12 1/15 1/22 1/26 1/30      Bike             Ankle AROM DF/PF, EV/INV 2x10 2x10 ea 3x10 3x10 ea DC to HEP HEP        Ankle ABCs  1x ea 1x 1x 1x DC HEP       4 way aleida  MRE 5''x10 ea 5''x15 5''x15 MRE Tband GTB 2x10 GTB 2x10 ea GTB 3x10 ea  NV OOT      Ankle circles X10 HEP 1x15 2x10 2x10 2x10 Wobble board all direction 2x10 Wobble board all direction 2x10 standing      Supine SLR  SLR 2x10  SLR 2x10 2x10 SLR 2x10 SLR 3x10 SLR S/L NV NV      Seated HR/TR  1x15 1x15  2x10 2x10 Standing 2x10 2x10       Arch raises  1x15 5''x15 2x10  5\" 2x10 Aleida standing 2x10 NV OOT      Short foot     5\" 2x10 NV       Standing SLR 3 way   1x10  1x10 On foam 1x15 ea On foam 1x15 ea 2x10 ea NV OOT      Gastroc stretch   5x10'' 5x10'' 3x15'' 20\" x5 w/ strap NV HEP      Gastroc/soleus stretch on 1/2 foam roll in standing     15\" x5 gastroc  5\" x10 soleus NV 30''x3                                CC EV/INV  1x10 1x10 2x10 ea 2x10 ea 2x10 3#  NV OOT      Ther Activity             Step ups    Step overs with foam 1x15 Step over with foam 1x15    6\" step up  FW/LAT  X15 ea.    6'' fwd/dwn/lat 2x10 6'' fwd/dwn/lat 2x10 overs      Squat             Gait Training                                       Modalities                                                            "

## 2024-02-02 ENCOUNTER — OFFICE VISIT (OUTPATIENT)
Dept: PHYSICAL THERAPY | Facility: CLINIC | Age: 69
End: 2024-02-02
Payer: MEDICARE

## 2024-02-02 DIAGNOSIS — M25.572 LEFT ANKLE PAIN, UNSPECIFIED CHRONICITY: Primary | ICD-10-CM

## 2024-02-02 PROCEDURE — 97112 NEUROMUSCULAR REEDUCATION: CPT

## 2024-02-02 PROCEDURE — 97110 THERAPEUTIC EXERCISES: CPT

## 2024-02-02 NOTE — PROGRESS NOTES
"Daily Note     Today's date: 2024  Patient name: Lilian Tamayo  : 1955  MRN: [id number]  Referring provider: Suhail Mckay *  Dx:   Encounter Diagnosis     ICD-10-CM    1. Left ankle pain, unspecified chronicity  M25.572           Start Time: 1115  Stop Time: 1155  Total time in clinic (min): 40 minutes    Subjective: Pt reports improvement in overall ankle mobility compared to previous session. She denies any medical updates.       Objective: See treatment diary below      Assessment: Tolerated treatment well. She reports no increase in pain with exercises completed. HR/TR were progressed to encourage greater ROM with half foam roll for both with good tolerance. Step ups were increased to 8inch today with good tolerance, she notes mild discomfort in left knee with step overs and was able to complete 15 reps this session. . Patient demonstrated fatigue post treatment and exhibited good technique with therapeutic exercises      Plan: Continue per plan of care.      Date 1/05 1/08 1/12 1/15 1/22 1/26 1/30 2/2   Visit # 1      7    FOTO done     X     Re-eval       X          Manuals 1/05 1/08 1/12 1/15 1/22 1/26 1/30 2/2     Ankle PROM to tolerance  FH FH FH ksg FH FH FH focus on PF      STM to lateral ankle   FH FH ksg FH FH KT trial this session for Lateral maleoli supprt KT tape demonstration of Patient                               Neuro Re-Ed             Foam WS  1x10 A/P. M/L   1x10 SL  1x10 w/ brace W/o brace 2x10 W/O brace 2x10 NP DC      Rocker board A/P, M/L     X20 ea NV 2x10 ea 2x10 ea     SLS     On foam 30\" x3 30''x3 on foam 30''x3 on foam Off foam 3x30''     BAPs              Foam steps   Fwd/lat WS 1x10 W/o brace 2x10 W/O brace 2x10 regular DC      Mini squats   1x10 on foam 1x10 on foam w/o brace 1x10 on foam w/o brace 2x10 2x10 STS 2x10                  Ther Ex 1/05 1/08 1/12 1/15 1/22 1/26 1/30 2/2     Bike             Ankle AROM DF/PF, EV/INV 2x10 2x10 ea 3x10 3x10 ea DC to " "HEP HEP        Ankle ABCs  1x ea 1x 1x 1x DC HEP       4 way aleida  MRE 5''x10 ea 5''x15 5''x15 MRE Tband GTB 2x10 GTB 2x10 ea GTB 3x10 ea  NV OOT EV/INV GTB 3x10     Ankle circles X10 HEP 1x15 2x10 2x10 2x10 Wobble board all direction 2x10 Wobble board all direction 2x10 standing Wobble board all direction 2x10 seated     Supine SLR  SLR 2x10  SLR 2x10 2x10 SLR 2x10 SLR 3x10 SLR S/L NV NV DC to HEP     Seated HR/TR  1x15 1x15  2x10 2x10 Standing 2x10 2x10  Off of half foam 2x10      Arch raises  1x15 5''x15 2x10  5\" 2x10 Aleida standing 2x10 NV OOT DC to HEP     Short foot     5\" 2x10 NV       Standing SLR 3 way   1x10  1x10 On foam 1x15 ea On foam 1x15 ea 2x10 ea NV OOT 2x10 ea     Gastroc stretch   5x10'' 5x10'' 3x15'' 20\" x5 w/ strap NV HEP      Gastroc/soleus stretch on 1/2 foam roll in standing     15\" x5 gastroc  5\" x10 soleus NV 30''x3 30''x3                               CC EV/INV  1x10 1x10 2x10 ea 2x10 ea 2x10 3#  NV OOT DC     Ther Activity             Step ups    Step overs with foam 1x15 Step over with foam 1x15    6\" step up  FW/LAT  X15 ea.    6'' fwd/dwn/lat 2x10 6'' fwd/dwn/lat 2x10 overs 8''/6''  fwd//lat 2x10     6'' step overs 1x15     Squat             Gait Training                                       Modalities                                            "

## 2024-02-05 ENCOUNTER — OFFICE VISIT (OUTPATIENT)
Dept: PHYSICAL THERAPY | Facility: CLINIC | Age: 69
End: 2024-02-05
Payer: MEDICARE

## 2024-02-05 DIAGNOSIS — M25.572 LEFT ANKLE PAIN, UNSPECIFIED CHRONICITY: Primary | ICD-10-CM

## 2024-02-05 PROCEDURE — 97110 THERAPEUTIC EXERCISES: CPT

## 2024-02-05 PROCEDURE — 97140 MANUAL THERAPY 1/> REGIONS: CPT

## 2024-02-05 NOTE — PROGRESS NOTES
"Daily Note     Today's date: 2024  Patient name: Lilian Tamayo  : 1955  MRN: [id number]  Referring provider: Suhail Mckay *  Dx:   Encounter Diagnosis     ICD-10-CM    1. Left ankle pain, unspecified chronicity  M25.572           Start Time: 1430  Stop Time: 1515  Total time in clinic (min): 45 minutes    Subjective: Pt denies any medical updates and reports min to no discomfort today.       Objective: See treatment diary below      Assessment: Tolerated treatment well. She is given reminders on proper reps/sets throughout session. She denies any increase in sx during treatment session. Patient demonstrated fatigue post treatment, exhibited good technique with therapeutic exercises, and would benefit from continued PT      Plan: Continue per plan of care.      Date 1/05 1/08 1/12 1/15 1/22 1/26 1/30 2/2   Visit # 1      7    FOTO done     X     Re-eval       X          Manuals 1/05 1/08 1/12 1/15 1/22 1/26 1/30 22 2/    Ankle PROM to tolerance  FH FH FH ksg FH FH FH focus on PF  FH    STM to lateral ankle   FH FH ksg FH FH KT trial this session for Lateral maleoli supprt KT tape demonstration of Patient                               Neuro Re-Ed             Foam WS  1x10 A/P. M/L   1x10 SL  1x10 w/ brace W/o brace 2x10 W/O brace 2x10 NP DC      Rocker board A/P, M/L     X20 ea NV 2x10 ea 2x10 ea 2x10    SLS     On foam 30\" x3 30''x3 on foam 30''x3 on foam Off foam 3x30'' 3x30''    BAPs              Foam steps   Fwd/lat WS 1x10 W/o brace 2x10 W/O brace 2x10 regular DC      Mini squats   1x10 on foam 1x10 on foam w/o brace 1x10 on foam w/o brace 2x10 2x10 STS 2x10 2x10                 Ther Ex 1/05 1/08 1/12 1/15 1/22 1/26 1/30 2/2 2/5    Bike             Ankle AROM DF/PF, EV/INV 2x10 2x10 ea 3x10 3x10 ea DC to HEP HEP        Ankle ABCs  1x ea 1x 1x 1x DC HEP       4 way cristal  MRE 5''x10 ea 5''x15 5''x15 MRE Tband GTB 2x10 GTB 2x10 ea GTB 3x10 ea  NV OOT EV/INV GTB 3x10 NV    Ankle circles X10 " "HEP 1x15 2x10 2x10 2x10 Wobble board all direction 2x10 Wobble board all direction 2x10 standing Wobble board all direction 2x10 seated Wobble board all direction 2x10 seated    Supine SLR  SLR 2x10  SLR 2x10 2x10 SLR 2x10 SLR 3x10 SLR S/L NV NV DC to HEP     Seated HR/TR  1x15 1x15  2x10 2x10 Standing 2x10 2x10  Off of half foam 2x10  Off of half foam 2x10     Arch raises  1x15 5''x15 2x10  5\" 2x10 Aleida standing 2x10 NV OOT DC to HEP     Short foot     5\" 2x10 NV       Standing SLR 3 way   1x10  1x10 On foam 1x15 ea On foam 1x15 ea 2x10 ea NV OOT 2x10 ea 3x10 ea     Gastroc stretch   5x10'' 5x10'' 3x15'' 20\" x5 w/ strap NV HEP      Gastroc/soleus stretch on 1/2 foam roll in standing     15\" x5 gastroc  5\" x10 soleus NV 30''x3 30''x3 30''x3  15''x3 on slant board                              CC EV/INV  1x10 1x10 2x10 ea 2x10 ea 2x10 3#  NV OOT DC     Ther Activity             Step ups    Step overs with foam 1x15 Step over with foam 1x15    6\" step up  FW/LAT  X15 ea.    6'' fwd/dwn/lat 2x10 6'' fwd/dwn/lat 2x10 overs 8''/6''  fwd//lat 2x10     6'' step overs 1x15 NV     6'' step overs 1x10    Squat             Gait Training                                       Modalities                                              "

## 2024-02-09 ENCOUNTER — OFFICE VISIT (OUTPATIENT)
Dept: PHYSICAL THERAPY | Facility: CLINIC | Age: 69
End: 2024-02-09
Payer: MEDICARE

## 2024-02-09 DIAGNOSIS — M25.572 LEFT ANKLE PAIN, UNSPECIFIED CHRONICITY: Primary | ICD-10-CM

## 2024-02-09 PROCEDURE — 97110 THERAPEUTIC EXERCISES: CPT

## 2024-02-09 PROCEDURE — 97112 NEUROMUSCULAR REEDUCATION: CPT

## 2024-02-09 NOTE — PROGRESS NOTES
"Daily Note     Today's date: 2024  Patient name: Lilian Tamayo  : 1955  MRN: [id number]  Referring provider: Suhail Mckay *  Dx:   Encounter Diagnosis     ICD-10-CM    1. Left ankle pain, unspecified chronicity  M25.572           Start Time: 0815  Stop Time: 0900  Total time in clinic (min): 45 minutes    Subjective: Pt reports she was able to amb longer without brace and with min discomfort she continues to feel apprehensive with hills and was advised to trial TM at home at 2-3% to control how long she is able to do this.       Objective: See treatment diary below      Assessment: Tolerated treatment well. She was given cues throughout session on proper reps/sets. She demonstrates good form with exercises and reports no elevation in sx during treatment session. Patient demonstrated fatigue post treatment, exhibited good technique with therapeutic exercises, and would benefit from continued PT      Plan: Continue per plan of care.      Date 1/05 1/08 1/12 1/15 1/22 1/26 1/30 2/2 2/5 29   Visit # 1      7      FOTO done     X       Re-eval       X            Manuals 1/05 1/08 1/12 1/15 1/22 1/26 1/30 2/2 2/5 2/9   Ankle PROM to tolerance  FH FH FH ksg FH FH FH focus on PF  FH FH   STM to lateral ankle   FH FH ksg FH FH KT trial this session for Lateral maleoli supprt KT tape demonstration of Patient                               Neuro Re-Ed             Foam WS  1x10 A/P. M/L   1x10 SL  1x10 w/ brace W/o brace 2x10 W/O brace 2x10 NP DC      Rocker board A/P, M/L     X20 ea NV 2x10 ea 2x10 ea 2x10 2x10   SLS     On foam 30\" x3 30''x3 on foam 30''x3 on foam Off foam 3x30'' 3x30'' 3x30''   BAPs              Foam steps   Fwd/lat WS 1x10 W/o brace 2x10 W/O brace 2x10 regular DC      Mini squats   1x10 on foam 1x10 on foam w/o brace 1x10 on foam w/o brace 2x10 2x10 STS 2x10 2x10 NV                Ther Ex 1/05 1/08 1/12 1/15 1/22 1/26 1/30 2   Bike             Ankle AROM DF/PF, EV/INV 2x10 " "2x10 ea 3x10 3x10 ea DC to HEP HEP        Ankle ABCs  1x ea 1x 1x 1x DC HEP       4 way aleida  MRE 5''x10 ea 5''x15 5''x15 MRE Tband GTB 2x10 GTB 2x10 ea GTB 3x10 ea  NV OOT EV/INV GTB 3x10 NV EV/INV GTB   Ankle circles X10 HEP 1x15 2x10 2x10 2x10 Wobble board all direction 2x10 Wobble board all direction 2x10 standing Wobble board all direction 2x10 seated Wobble board all direction 2x10 seated Wobble board all direction 2x10    Supine SLR  SLR 2x10  SLR 2x10 2x10 SLR 2x10 SLR 3x10 SLR S/L NV NV DC to HEP     Seated HR/TR  1x15 1x15  2x10 2x10 Standing 2x10 2x10  Off of half foam 2x10  Off of half foam 2x10  Off of half foam 2x10    Arch raises  1x15 5''x15 2x10  5\" 2x10 Aleida standing 2x10 NV OOT DC to HEP     Short foot     5\" 2x10 NV       Standing SLR 3 way   1x10  1x10 On foam 1x15 ea On foam 1x15 ea 2x10 ea NV OOT 2x10 ea 3x10 ea  3x10 ea    Gastroc stretch   5x10'' 5x10'' 3x15'' 20\" x5 w/ strap NV HEP      Gastroc/soleus stretch on 1/2 foam roll in standing     15\" x5 gastroc  5\" x10 soleus NV 30''x3 30''x3 30''x3  15''x3 on slant board 30''x3  15''x3 on slant board                             CC EV/INV  1x10 1x10 2x10 ea 2x10 ea 2x10 3#  NV OOT DC     Ther Activity             Step ups    Step overs with foam 1x15 Step over with foam 1x15    6\" step up  FW/LAT  X15 ea.    6'' fwd/dwn/lat 2x10 6'' fwd/dwn/lat 2x10 overs 8''/6''  fwd//lat 2x10     6'' step overs 1x15 NV     6'' step overs 1x10 Lat 6'' 2x10  6'' step overs 1x10   Squat             Gait Training                                       Modalities                                                "

## 2024-02-12 ENCOUNTER — OFFICE VISIT (OUTPATIENT)
Dept: PHYSICAL THERAPY | Facility: CLINIC | Age: 69
End: 2024-02-12
Payer: MEDICARE

## 2024-02-12 DIAGNOSIS — M25.572 LEFT ANKLE PAIN, UNSPECIFIED CHRONICITY: Primary | ICD-10-CM

## 2024-02-12 PROCEDURE — 97112 NEUROMUSCULAR REEDUCATION: CPT

## 2024-02-12 PROCEDURE — 97140 MANUAL THERAPY 1/> REGIONS: CPT

## 2024-02-12 PROCEDURE — 97110 THERAPEUTIC EXERCISES: CPT

## 2024-02-12 NOTE — PROGRESS NOTES
"Daily Note     Today's date: 2024  Patient name: Lilian Tamayo  : 1955  MRN: [id number]  Referring provider: Suhail Mckay *  Dx:   Encounter Diagnosis     ICD-10-CM    1. Left ankle pain, unspecified chronicity  M25.572                      Subjective: Pt reports overall ankle is feeling better.   Reports overall improved mobility.        Objective: See treatment diary below      Assessment: Tolerated treatment well. Progressing well with ROM and strengthening.  Tightness noted over lateral malleolus. Patient demonstrated fatigue post treatment, exhibited good technique with therapeutic exercises, and would benefit from continued PT      Plan: Continue per plan of care.      Date 1/05 1/08 1/12 1/15 1/22 1/26 1/30 2/2 2/5 2/9 2/12   Visit # 1      7    11   FOTO done     X        Re-eval       X             Manuals 2/12 1/08 1/12 1/15 1/22 1/26 1/30 2/2 2/5 2/9   Ankle PROM to tolerance ksg FH FH FH ksg FH FH FH focus on PF  FH FH   STM to lateral ankle   FH FH ksg FH FH KT trial this session for Lateral maleoli supprt KT tape demonstration of Patient                               Neuro Re-Ed             Foam WS  1x10 A/P. M/L   1x10 SL  1x10 w/ brace W/o brace 2x10 W/O brace 2x10 NP DC      Rocker board A/P, M/L 2x10 ea    X20 ea NV 2x10 ea 2x10 ea 2x10 2x10   SLS 3x30\" on foam    On foam 30\" x3 30''x3 on foam 30''x3 on foam Off foam 3x30'' 3x30'' 3x30''   BAPs              Foam steps   Fwd/lat WS 1x10 W/o brace 2x10 W/O brace 2x10 regular DC      Mini squats 2x10  1x10 on foam 1x10 on foam w/o brace 1x10 on foam w/o brace 2x10 2x10 STS 2x10 2x10 NV                Ther Ex 2/12 1/08 1/12 1/15 1/22 1/26 1/30 2 2   Bike             Ankle AROM DF/PF, EV/INV  2x10 ea 3x10 3x10 ea DC to HEP HEP        Ankle ABCs  1x ea 1x 1x 1x DC HEP       4 way cristal EV/IV  GTB x20 ea MRE 5''x10 ea 5''x15 5''x15 MRE Tband GTB 2x10 GTB 2x10 ea GTB 3x10 ea  NV OOT EV/INV GTB 3x10 NV EV/INV GTB   Ankle " "circles Wobble board all directions x20 ea 1x15 2x10 2x10 2x10 Wobble board all direction 2x10 Wobble board all direction 2x10 standing Wobble board all direction 2x10 seated Wobble board all direction 2x10 seated Wobble board all direction 2x10    Supine SLR  SLR 2x10  SLR 2x10 2x10 SLR 2x10 SLR 3x10 SLR S/L NV NV DC to HEP     Seated HR/TR Off of half foam 2x10 1x15 1x15  2x10 2x10 Standing 2x10 2x10  Off of half foam 2x10  Off of half foam 2x10  Off of half foam 2x10    Arch raises  1x15 5''x15 2x10  5\" 2x10 Aleida standing 2x10 NV OOT DC to HEP     Short foot     5\" 2x10 NV       Standing SLR 3 way  2# 2x10 ea 1x10  1x10 On foam 1x15 ea On foam 1x15 ea 2x10 ea NV OOT 2x10 ea 3x10 ea  3x10 ea    Gastroc stretch   5x10'' 5x10'' 3x15'' 20\" x5 w/ strap NV HEP      Gastroc/soleus stretch on 1/2 foam roll in standing 30''x3  15''x3 on 1/2 foam roll.     15\" x5 gastroc  5\" x10 soleus NV 30''x3 30''x3 30''x3  15''x3 on slant board 30''x3  15''x3 on slant board                             CC EV/INV  1x10 1x10 2x10 ea 2x10 ea 2x10 3#  NV OOT DC     Ther Activity             Step ups Lat 6\" step overs 2x10   Step overs with foam 1x15 Step over with foam 1x15    6\" step up  FW/LAT  X15 ea.    6'' fwd/dwn/lat 2x10 6'' fwd/dwn/lat 2x10 overs 8''/6''  fwd//lat 2x10     6'' step overs 1x15 NV     6'' step overs 1x10 Lat 6'' 2x10  6'' step overs 1x10   Squat             Gait Training                                       Modalities                                                "

## 2024-02-16 ENCOUNTER — OFFICE VISIT (OUTPATIENT)
Dept: PHYSICAL THERAPY | Facility: CLINIC | Age: 69
End: 2024-02-16
Payer: MEDICARE

## 2024-02-16 DIAGNOSIS — M25.572 LEFT ANKLE PAIN, UNSPECIFIED CHRONICITY: Primary | ICD-10-CM

## 2024-02-16 PROCEDURE — 97112 NEUROMUSCULAR REEDUCATION: CPT

## 2024-02-16 PROCEDURE — 97110 THERAPEUTIC EXERCISES: CPT

## 2024-02-16 PROCEDURE — 97140 MANUAL THERAPY 1/> REGIONS: CPT

## 2024-02-16 NOTE — PROGRESS NOTES
"Daily Note     Today's date: 2024  Patient name: Lilian Tamayo  : 1955  MRN: [id number]  Referring provider: Suhail Mckay *  Dx:   Encounter Diagnosis     ICD-10-CM    1. Left ankle pain, unspecified chronicity  M25.572                      Subjective: Pt reports she continues to see improvement with symptoms at L ankle.  Feels like she still has most difficulty with inv/evr AROM.        Objective: See treatment diary below.  Slight soreness at L ankle present post treatment.  Advised to utilize ice to help manage symptoms should soreness worsen.        Assessment: Tolerated treatment well. Continued with program as outlined below.  Progressed with addition of static rockerboard balance, in effort to further improve stability of L ankle; more challenged with m/l static balance than a/p.  Increased ankle strategy at L ankle noted during SLS on foam.  Patient would benefit from continued PT to further improve strength, decrease pain, and maximize overall function with ADL's.      Plan: Continue per plan of care.      Date  2   Visit #       7    11   FOTO      X        Re-eval       X             Manuals 2/12 2/16  1/15 1/22 1/26 1/30 2/2 2/5 2/9   Ankle PROM to tolerance ksg AFB  FH ksg FH FH FH focus on PF  FH FH   STM to lateral ankle    FH ksg FH FH KT trial this session for Lateral maleoli supprt KT tape demonstration of Patient                               Neuro Re-Ed             Foam WS    W/o brace 2x10 W/O brace 2x10 NP DC      Rocker board A/P, M/L 2x10 ea 2x10 ea   X20 ea NV 2x10 ea 2x10 ea 2x10 2x10   Rocker board static balance  A/P, M/L  1 min ea           SLS 3x30\" on foam 3x30\" on foam   On foam 30\" x3 30''x3 on foam 30''x3 on foam Off foam 3x30'' 3x30'' 3x30''   BAPs              Foam steps    W/o brace 2x10 W/O brace 2x10 regular DC      Mini squats 2x10 2x10  1x10 on foam w/o brace 1x10 on foam w/o brace 2x10 2x10 STS 2x10 2x10 NV        " "        Ther Ex 2/12 2/16  1/15 1/22 1/26 1/30 2/2 2/5 2/9   Bike             Ankle AROM DF/PF, EV/INV    3x10 ea DC to HEP HEP        Ankle ABCs    1x 1x DC HEP       4 way aleida EV/IV  GTB x20 ea EV/IV  GTB x20 ea  5''x15 MRE Tband GTB 2x10 GTB 2x10 ea GTB 3x10 ea  NV OOT EV/INV GTB 3x10 NV EV/INV GTB   Ankle circles Wobble board all directions x20 ea Wobble board all directions x20 ea  2x10 2x10 Wobble board all direction 2x10 Wobble board all direction 2x10 standing Wobble board all direction 2x10 seated Wobble board all direction 2x10 seated Wobble board all direction 2x10    Supine SLR    2x10 SLR 2x10 SLR 3x10 SLR S/L NV NV DC to HEP     Seated HR/TR Off of half foam 2x10 Off of half foam 2x10   2x10 2x10 Standing 2x10 2x10  Off of half foam 2x10  Off of half foam 2x10  Off of half foam 2x10    Arch raises    2x10  5\" 2x10 Aleida standing 2x10 NV OOT DC to HEP     Short foot     5\" 2x10 NV       Standing SLR 3 way  2# 2x10 ea 2# 2x10 ea  On foam 1x15 ea On foam 1x15 ea 2x10 ea NV OOT 2x10 ea 3x10 ea  3x10 ea    Gastroc stretch     3x15'' 20\" x5 w/ strap NV HEP      Gastroc/soleus stretch on 1/2 foam roll in standing 30''x3  15''x3 on 1/2 foam roll.  30''x3  15''x3 on 1/2 foam roll/SB   15\" x5 gastroc  5\" x10 soleus NV 30''x3 30''x3 30''x3  15''x3 on slant board 30''x3  15''x3 on slant board                             CC EV/INV    2x10 ea 2x10 ea 2x10 3#  NV OOT DC     Ther Activity             Step ups Lat 6\" step overs 2x10 Lat 6\" step overs 2x10  Step overs with foam 1x15 Step over with foam 1x15    6\" step up  FW/LAT  X15 ea.    6'' fwd/dwn/lat 2x10 6'' fwd/dwn/lat 2x10 overs 8''/6''  fwd//lat 2x10     6'' step overs 1x15 NV     6'' step overs 1x10 Lat 6'' 2x10  6'' step overs 1x10   Squat             Gait Training                                       Modalities                                                  "

## 2024-02-20 ENCOUNTER — OFFICE VISIT (OUTPATIENT)
Dept: PHYSICAL THERAPY | Facility: CLINIC | Age: 69
End: 2024-02-20
Payer: MEDICARE

## 2024-02-20 DIAGNOSIS — M25.572 LEFT ANKLE PAIN, UNSPECIFIED CHRONICITY: Primary | ICD-10-CM

## 2024-02-20 PROCEDURE — 97140 MANUAL THERAPY 1/> REGIONS: CPT

## 2024-02-20 PROCEDURE — 97110 THERAPEUTIC EXERCISES: CPT

## 2024-02-20 NOTE — PROGRESS NOTES
Daily Note     Today's date: 2024  Patient name: Lilian Tamayo  : 1955  MRN: [id number]  Referring provider: Suhail Mckay *  Dx:   Encounter Diagnosis     ICD-10-CM    1. Left ankle pain, unspecified chronicity  M25.572           Start Time: 1400  Stop Time: 1445  Total time in clinic (min): 45 minutes    Subjective: Pt continues to report discomfort with pressure along lateral malleoli when sleeping on her side and was educated to utilize small towel and/or pillow between ankle to disperse weight of other ankle and decrease discomfort. She was additionally educated to continue use of compression stocking.     Objective: See treatment diary below      Assessment: Tolerated treatment well. Patient given education on walking program and introduced to TM for test of walking tolerance at 4%. Discussed with patient in preparation for next week as her last scheduled visit and for RE, to determine if any functional deficits continue to persist or if patient is comfortable with DC, also dependent on objective impairments and independence with HEP. Pt tolerates amb for 15 w/o increase in sx and was educated to amb on TM at home for 15 min as well tomorrow on TM.     Patient demonstrated fatigue post treatment, exhibited good technique with therapeutic exercises, and would benefit from continued PT      Plan: Continue per plan of care.      Date  2 2 2   Visit #  13     7    11   FOTO  X    X        Re-eval       X             Manuals 2/12 2/16 2/20   1/15 1/22 1/26 1/30 2   Ankle PROM to tolerance ksg AFB FH Calcaneal mob grade2-3   FH ksg FH FH FH focus on PF    STM to lateral ankle      FH ksg FH FH KT trial this session for Lateral maleoli supprt KT tape demonstration of Patient                             Neuro Re-Ed             Foam WS      W/o brace 2x10 W/O brace 2x10 NP DC    Rocker board A/P, M/L 2x10 ea 2x10 ea     X20 ea NV 2x10 ea 2x10 ea   [place]  "board static balance  A/P, M/L  1 min ea NV          SLS 3x30\" on foam 3x30\" on foam     On foam 30\" x3 30''x3 on foam 30''x3 on foam Off foam 3x30''   BAPs              Foam steps      W/o brace 2x10 W/O brace 2x10 regular DC    Mini squats 2x10 2x10 NV   1x10 on foam w/o brace 1x10 on foam w/o brace 2x10 2x10 STS 2x10                Ther Ex 2/12 2/16 2/20   1/15 1/22 1/26 1/30 2/2   Bike   TM walking 15 min 4% incline endurance          Ankle AROM DF/PF, EV/INV      3x10 ea DC to HEP HEP      Ankle ABCs      1x 1x DC HEP     4 way aleida EV/IV  GTB x20 ea EV/IV  GTB x20 ea NV   5''x15 MRE Tband GTB 2x10 GTB 2x10 ea GTB 3x10 ea  NV OOT EV/INV GTB 3x10   Ankle circles Wobble board all directions x20 ea Wobble board all directions x20 ea NV   2x10 2x10 Wobble board all direction 2x10 Wobble board all direction 2x10 standing Wobble board all direction 2x10 seated   Supine SLR      2x10 SLR 2x10 SLR 3x10 SLR S/L NV NV DC to HEP   Seated HR/TR Off of half foam 2x10 Off of half foam 2x10 NV    2x10 2x10 Standing 2x10 2x10  Off of half foam 2x10    Arch raises      2x10  5\" 2x10 Aleida standing 2x10 NV OOT DC to HEP   Short foot       5\" 2x10 NV     Standing SLR 3 way  2# 2x10 ea 2# 2x10 ea NV   On foam 1x15 ea On foam 1x15 ea 2x10 ea NV OOT 2x10 ea   Gastroc stretch       3x15'' 20\" x5 w/ strap NV HEP    Gastroc/soleus stretch on 1/2 foam roll in standing 30''x3  15''x3 on 1/2 foam roll.  30''x3  15''x3 on 1/2 foam roll/SB 30''x3  15''x3 on 1/2 foam roll/SB    15\" x5 gastroc  5\" x10 soleus NV 30''x3 30''x3                             CC EV/INV      2x10 ea 2x10 ea 2x10 3#  NV OOT DC   Ther Activity             Step ups Lat 6\" step overs 2x10 Lat 6\" step overs 2x10 NV   Step overs with foam 1x15 Step over with foam 1x15    6\" step up  FW/LAT  X15 ea.    6'' fwd/dwn/lat 2x10 6'' fwd/dwn/lat 2x10 overs 8''/6''  fwd//lat 2x10     6'' step overs 1x15   Squat             Gait Training                                     "   Modalities

## 2024-02-23 ENCOUNTER — OFFICE VISIT (OUTPATIENT)
Dept: PHYSICAL THERAPY | Facility: CLINIC | Age: 69
End: 2024-02-23
Payer: MEDICARE

## 2024-02-23 DIAGNOSIS — M25.572 LEFT ANKLE PAIN, UNSPECIFIED CHRONICITY: Primary | ICD-10-CM

## 2024-02-23 PROCEDURE — 97140 MANUAL THERAPY 1/> REGIONS: CPT

## 2024-02-23 PROCEDURE — 97110 THERAPEUTIC EXERCISES: CPT

## 2024-02-23 NOTE — PROGRESS NOTES
"Daily Note     Today's date: 2024  Patient name: Lilian Tamayo  : 1955  MRN: [id number]  Referring provider: Suhail Mckay *  Dx:   Encounter Diagnosis     ICD-10-CM    1. Left ankle pain, unspecified chronicity  M25.572           Start Time: 0815  Stop Time: 0855  Total time in clinic (min): 40 minutes    Subjective: Pt was able to complete 20 min on TM with 3% incline at home. He reports after calf stretch on slant board calf was sig sore, but with OTC med and topical cream it feels better. She reports mild soreness today.       Objective: See treatment diary below      Assessment: Tolerated treatment well. She notes improvement in sx after manuals and was able to tolerate all exercises well this session. Patient demonstrated fatigue post treatment, exhibited good technique with therapeutic exercises, and would benefit from continued PT      Plan: Continue per plan of care.      Date    Visit #  13 14    7    11   FOTO  X    X        Re-eval       X             Manuals 2/12 2/16 2/20 2/23  1/15 1/22 1/26 1/30 2/2   Ankle PROM to tolerance ksg AFB FH Calcaneal mob grade2-3   FH ksg FH FH FH focus on PF    STM to lateral ankle      FH ksg FH FH KT trial this session for Lateral maleoli supprt KT tape demonstration of Patient                             Neuro Re-Ed             Foam WS      W/o brace 2x10 W/O brace 2x10 NP DC    Rocker board A/P, M/L 2x10 ea 2x10 ea  2x10 ea   X20 ea NV 2x10 ea 2x10 ea   Rocker board static balance  A/P, M/L  1 min ea NV          SLS 3x30\" on foam 3x30\" on foam  3x30\" on foam   On foam 30\" x3 30''x3 on foam 30''x3 on foam Off foam 3x30''   BAPs              Foam steps      W/o brace 2x10 W/O brace 2x10 regular DC    Mini squats 2x10 2x10 NV 2x10  1x10 on foam w/o brace 1x10 on foam w/o brace 2x10 2x10 STS 2x10                Ther Ex 2/12 2/16 2/20 2/23  1/15 1/22 1/26 1/30 2/2   Bike   TM walking 15 min 4% incline " "endurance Home         Ankle AROM DF/PF, EV/INV      3x10 ea DC to HEP HEP      Ankle ABCs      1x 1x DC HEP     4 way aleida EV/IV  GTB x20 ea EV/IV  GTB x20 ea NV   5''x15 MRE Tband GTB 2x10 GTB 2x10 ea GTB 3x10 ea  NV OOT EV/INV GTB 3x10   Ankle circles Wobble board all directions x20 ea Wobble board all directions x20 ea NV Wobble board all directions x20 ea  2x10 2x10 Wobble board all direction 2x10 Wobble board all direction 2x10 standing Wobble board all direction 2x10 seated   Supine SLR      2x10 SLR 2x10 SLR 3x10 SLR S/L NV NV DC to HEP   Seated HR/TR Off of half foam 2x10 Off of half foam 2x10 NV Off of half foam, 5'' 2x10   2x10 2x10 Standing 2x10 2x10  Off of half foam 2x10    Arch raises      2x10  5\" 2x10 Aleida standing 2x10 NV OOT DC to HEP   Short foot       5\" 2x10 NV     Standing SLR 3 way  2# 2x10 ea 2# 2x10 ea NV 2# 2x10 ea  On foam 1x15 ea On foam 1x15 ea 2x10 ea NV OOT 2x10 ea   Gastroc stretch       3x15'' 20\" x5 w/ strap NV HEP    Gastroc/soleus stretch on 1/2 foam roll in standing 30''x3  15''x3 on 1/2 foam roll.  30''x3  15''x3 on 1/2 foam roll/SB 30''x3  15''x3 on 1/2 foam roll/SB    15\" x5 gastroc  5\" x10 soleus NV 30''x3 30''x3                             CC EV/INV      2x10 ea 2x10 ea 2x10 3#  NV OOT DC   Ther Activity             Step ups Lat 6\" step overs 2x10 Lat 6\" step overs 2x10 NV 6'' ups/lat     4\" step overs 2x10  Step overs with foam 1x15 Step over with foam 1x15    6\" step up  FW/LAT  X15 ea.    6'' fwd/dwn/lat 2x10 6'' fwd/dwn/lat 2x10 overs 8''/6''  fwd//lat 2x10     6'' step overs 1x15   Squat             Gait Training                                       Modalities                                                      "

## 2024-02-29 ENCOUNTER — EVALUATION (OUTPATIENT)
Dept: PHYSICAL THERAPY | Facility: CLINIC | Age: 69
End: 2024-02-29
Payer: MEDICARE

## 2024-02-29 DIAGNOSIS — M25.572 LEFT ANKLE PAIN, UNSPECIFIED CHRONICITY: Primary | ICD-10-CM

## 2024-02-29 PROCEDURE — 97110 THERAPEUTIC EXERCISES: CPT

## 2024-02-29 PROCEDURE — 97112 NEUROMUSCULAR REEDUCATION: CPT

## 2024-02-29 NOTE — LETTER
2024      No Recipients    Patient: Lilian Tamayo   YOB: 1955   Date of Visit: 2024     Encounter Diagnosis     ICD-10-CM    1. Left ankle pain, unspecified chronicity  M25.572           Dear Dr. Mckay:    Thank you for your recent referral of Lilian Tamayo. Please review the attached evaluation summary from Lilian's recent visit.     Please verify that you agree with the plan of care by signing the attached order.     If you have any questions or concerns, please do not hesitate to call.     I sincerely appreciate the opportunity to share in the care of one of your patients and hope to have another opportunity to work with you in the near future.       Sincerely,    Estephanie Schulte, PT      Referring Provider:      I certify that I have read the below Plan of Care and certify the need for these services furnished under this plan of treatment while under my care.                    Suhail Mckay II, PA-C  [address] [place] [address]  Via Fax: [phone]          PT Re-Evaluation     Today's date: 2024  Patient name: Lilian Tamayo  : 1955  MRN: [id number]  Referring provider: Suhail Mckay *  Dx: No diagnosis found.               Assessment  Assessment details: Lilian is a 69 yo male presenting to OP PT secondary to  RE of L lateral malleoli avulsion fracture which occurred on 23. Pt is amb without lace up brace, about 10 minutes before onset of soreness from sneaker rubing agianst lateral malleoli. PT taped along this region for extra support in shoe and pt notes immediate relief. Pt demonstrates sig strength and ankle AROM gains compared to Initial evaluation. She continues to present with mild balance deficits, PF AROM deficits, and EV/iNV strength deficits. Pt would benefit from continued therapy to further progress gains and increase tolerance to daily activities. Pt would benefit from skilled PT to address impairments  and improve gait mechanics as well as tolerance to PLOF. Pt is in agreement with POC.   Impairments: abnormal or restricted ROM, activity intolerance and pain with function    Symptom irritability: moderateUnderstanding of Dx/Px/POC: good   Prognosis: good    Goals  1. Pt will demonstrate a decrease pain by 50% in 4 weeks to improve tolerance with prolonged standing/walking (MET)   2. Pt will Increase  lower extremity strength golbally to 4/5 in 6 weeks to improve tolerance to amb >20 minutes per PLOF (10-15 minutes)  3. Pt will demonstrate tolerance to maintaining SLS to 30 sec to decrease imbalance with amb (improved to 25 sec)  4. Pt will demonstrate 10 deg improvement in ankle DF AROM to allow for improved ease with squatting, stair climbing, and normalize gait mechanics (MET)    1. Return to Prior Level of Function in 4-6 weeks (progress)  2. Pt will demonstrate [place] with progressive home exercise program to improve carryover and decrease recurrence of symptoms. (Progress)  3. Pt will demonstrate 20% improvement in FOTO score to increase tolerance to functional activities (progress)  4. Pt will demonstrate 4+/5 in LE strength to allow for improved tolerance to prolonged amb/standing in 6-8 weeks (progress)        Plan  Patient would benefit from: PT eval and skilled physical therapy  Planned modality interventions: TENS, manual electrical stimulation, thermotherapy: hydrocollator packs and cryotherapy  Planned therapy interventions: IASTM, joint mobilization, manual therapy, massage, patient education, neuromuscular re-education, strengthening, stretching, therapeutic activities, therapeutic exercise, balance and gait training  Frequency: 2x week  Duration in weeks: 8  Plan of Care beginning date: 1/30/2024  Plan of Care expiration date: 3/16/2024  Treatment plan discussed with: patient      Subjective Evaluation  RE:  1/30/24   Pt reports 70% improvement since beginning therapy. She is able to  negotiate steps non-reciprocally and notes overall increased tolerance to daily activities. She has been slowly weaning out of her lace up brace, with only discomfort noted along lateral maleoli, tolerance about 10-15 minutes before onset of pain. Pt goals going forward include to be able to walk 1-3 miles without brace.      History of Present Illness  Date of onset: 2023  Mechanism of injury: trauma  Mechanism of injury: Lilian is a 68-year-old female presenting to OP PT fracture which occurred on 23, pt was in CAM boot for 6 weeks and is currently in lace up brace until follow up with MD in 3-4 weeks. She had follow up radiograph and clinical evaluation for her left ankle lateral malleolus avulsion fracture. Per MD was instructed to and may begin transition away from the Raptor brace when she feels as though she is ready. Pt reports she is able to amb around her house however, with some pain after 1 hour before taking a break. She reports brace has been rubbing against outside of her ankle. Pt reports she has majority of her pain with prolonged standing. She reports she tries to ascend stairs normally continues to have difficulty descending due to brace.           Recurrent probem    Quality of life: good    Patient Goals  Patient goals for therapy: increased strength, independence with ADLs/IADLs and decreased pain  Patient goal: Walk without brace  Pain  Current pain ratin  At best pain ratin  At worst pain ratin  Quality: dull ache  Relieving factors: medications, rest, support and ice  Aggravating factors: standing, walking and stair climbing  Progression: no change    Social Support  Steps to enter house: yes  Stairs in house: yes   Lives in: multiple-level home  Lives with: spouse    Employment status: not working  Hand dominance: right    Treatments  Previous treatment: medication and immobilization        Objective     Observations   Left Ankle/Foot   Positive for edema.  "    Palpation   Left   Tenderness of the anterior tibialis and peroneus.     Tenderness   Left Ankle/Foot   Tenderness in the anterior talofibular ligament and lateral malleolus.     Neurological Testing     Sensation     Ankle/Foot   Left Ankle/Foot   Intact: light touch    Active Range of Motion   Left Ankle/Foot   Dorsiflexion (ke): 23 degrees with pain  Plantar flexion: 40 degrees   Inversion: 25 degrees   Eversion: 20 degrees     Passive Range of Motion   Left Ankle/Foot    Dorsiflexion (ke): 23 degrees with pain  Plantar flexion: 40 degrees with pain  Inversion: 30 degrees with pain  Eversion: 20 degrees with pain    Strength/Myotome Testing   Left ankle  DF 4/5  PF 4/5  Ev 4/5  Inv 4-/5  Great toe flex 4+  Great toe ext 4+    Right Ankle/Foot   Dorsiflexion: 4+  Plantar flexion: 4+  Eversion: 4+  Inversion 4+  Great toe flexion: 4+  Great toe extension: 4+    Additional Strength Details  Not assessed due to lack of full motion     Ambulation     Observational Gait   Gait: asymmetric   Decreased walking speed, left stance time and left step length.   Left foot contact pattern: foot flat                Plan: Continue per plan of care.   Date 2/16 2/20 2/23 1/22 1/26 1/30 2/2 2/5 2/9 2/12   Visit #   13 14       7       11   FOTO   X       X             Re-eval             X                    Manuals 2/12 2/16 2/20 2/23 2/29   Ankle PROM to tolerance ksg [employer] FH Calcaneal mob grade2-3       STM to lateral ankle                                         Neuro Re-Ed             Foam WS             Rocker board A/P, M/L 2x10 ea 2x10 ea   2x10 ea     Rocker board static balance   A/P, M/L  1 min ea NV       SLS 3x30\" on foam 3x30\" on foam   3x30\" on foam     BAPs              Foam steps             Mini squats 2x10 2x10 NV 2x10                   Ther Ex 2/12 2/16 2/20 2/23     Bike     TM walking 15 min 4% incline endurance Home     Ankle AROM DF/PF, EV/INV             Ankle ABCs             4 way cristal EV/IV  GTB " "x20 ea EV/IV  GTB x20 ea NV       Ankle circles Wobble board all directions x20 ea Wobble board all directions x20 ea NV Wobble board all directions x20 ea     Supine SLR             Seated HR/TR Off of half foam 2x10 Off of half foam 2x10 NV Off of half foam, 5'' 2x10     Arch raises             Short foot             Standing SLR 3 way  2# 2x10 ea 2# 2x10 ea NV 2# 2x10 ea     Gastroc stretch              Gastroc/soleus stretch on 1/2 foam roll in standing 30''x3  15''x3 on 1/2 foam roll.  30''x3  15''x3 on 1/2 foam roll/SB 30''x3  15''x3 on 1/2 foam roll/SB                                   CC EV/INV             Ther Activity             Step ups Lat 6\" step overs 2x10 Lat 6\" step overs 2x10 NV 6'' ups/lat      4\" step overs 2x10     Squat             Gait Training                                         Modalities                                                                    "

## 2024-02-29 NOTE — PROGRESS NOTES
PT Re-Evaluation     Today's date: 2024  Patient name: Lilian Tamayo  : 1955  MRN: [id number]  Referring provider: Suhail Mckay *  Dx:   Encounter Diagnosis     ICD-10-CM    1. Left ankle pain, unspecified chronicity  M25.572           Start Time: 0830  Stop Time: 0910  Total time in clinic (min): 40 minutes    Assessment  Assessment details: Lilian is a 67 yo male presenting to OP PT secondary to  RE of L lateral malleoli avulsion fracture which occurred on 23. Pt ankle has been improving significantly pt is making good progress towards goals. Pt demonstrates normalized strength and L ankle ROM. HEP will be reviewed the next two visits to make sure patient is comofrtable and independent with exercises. Pt is comfortable with DC on 24 to conitnue independently. If she has any questions concerns or persisting symptoms pt is educated to call or obtain referral if needed.    Symptom irritability: moderateUnderstanding of Dx/Px/POC: good   Prognosis: good    Goals  1. Pt will demonstrate a decrease pain by 50% in 4 weeks to improve tolerance with prolonged standing/walking (MET)   2. Pt will Increase  lower extremity strength golbally to 4/5 in 6 weeks to improve tolerance to amb >20 minutes per PLOF MET  3. Pt will demonstrate tolerance to maintaining SLS to 30 sec to decrease imbalance with amb MET  4. Pt will demonstrate 10 deg improvement in ankle DF AROM to allow for improved ease with squatting, stair climbing, and normalize gait mechanics (MET)    1. Return to Prior Level of Function in 4-6 weeks MET  2. Pt will demonstrate [place] with progressive home exercise program to improve carryover and decrease recurrence of symptoms. (Progress)  3. Pt will demonstrate 20% improvement in FOTO score to increase tolerance to functional activities MET  4. Pt will demonstrate 4+/5 in LE strength to allow for improved tolerance to prolonged amb/standing in 6-8 weeks MET with most  except Eversion, will continue with HEP        Plan  Patient would benefit from: PT eval and skilled physical therapy  Planned modality interventions: TENS, manual electrical stimulation, thermotherapy: hydrocollator packs and cryotherapy  Planned therapy interventions: IASTM, joint mobilization, manual therapy, massage, patient education, neuromuscular re-education, strengthening, stretching, therapeutic activities, therapeutic exercise, balance and gait training  Frequency: 2x week  Duration in weeks: 8  Plan of Care beginning date: 2/29/2024  Plan of Care expiration date:3/5/24  Treatment plan discussed with: patient      Subjective Evaluation  RE: 2/29/24:   Pt reports she was able to amb 3 miles and slight inclines/declines. She reports no difficulty with ankle when climbing stairs. She additionally feels reports sig improvement compared to IE. She reports continues to have pressure sensitivity when walking or laying on it for long periods of time along lateral malleoli, even though sensitivity has improved she continues to note mod discomfort with repeated or prolonged touch to lateral ankle. This is alleviated with KT tape or sock. She reports contralateral calf continues to be sore intermittently and is adivsed to do gentle calf stretches with towel rather than step stretch if soreness persists.     RE:  1/30/24   Pt reports 70% improvement since beginning therapy. She is able to negotiate steps non-reciprocally and notes overall increased tolerance to daily activities. She has been slowly weaning out of her lace up brace, with only discomfort noted along lateral maleoli, tolerance about 10-15 minutes before onset of pain. Pt goals going forward include to be able to walk 1-3 miles without brace.      History of Present Illness  Date of onset: 11/19/2023  Mechanism of injury: trauma  Mechanism of injury: Lilian is a 68-year-old female presenting to OP PT fracture which occurred on 11/19/23, pt was in CAM  boot for 6 weeks and is currently in lace up brace until follow up with MD in 3-4 weeks. She had follow up radiograph and clinical evaluation for her left ankle lateral malleolus avulsion fracture. Per MD was instructed to and may begin transition away from the Raptor brace when she feels as though she is ready. Pt reports she is able to amb around her house however, with some pain after 1 hour before taking a break. She reports brace has been rubbing against outside of her ankle. Pt reports she has majority of her pain with prolonged standing. She reports she tries to ascend stairs normally continues to have difficulty descending due to brace.           Recurrent probem    Quality of life: good    Patient Goals  Patient goals for therapy: increased strength, independence with ADLs/IADLs and decreased pain  Patient goal: Walk without brace  Pain  Current pain ratin  At best pain ratin  At worst pain ratin  Quality: dull ache  Relieving factors: medications, rest, support and ice  Aggravating factors: standing, walking and stair climbing  Progression: no change    Social Support  Steps to enter house: yes  Stairs in house: yes   Lives in: multiple-level home  Lives with: spouse    Employment status: not working  Hand dominance: right    Treatments  Previous treatment: medication and immobilization        Objective     Observations   Left Ankle/Foot   Positive for edema.     Palpation   Left   Tenderness of the anterior tibialis and peroneus.     Tenderness   Left Ankle/Foot   Tenderness in the anterior talofibular ligament and lateral malleolus.     Neurological Testing     Sensation     Ankle/Foot   Left Ankle/Foot   Intact: light touch    Active Range of Motion   Left Ankle/Foot   Dorsiflexion (ke): 23 degrees with pain  Plantar flexion: 50 degrees   Inversion: 33 degrees   Eversion: 20 degrees     Passive Range of Motion   Left Ankle/Foot    Dorsiflexion (ke): 23 degrees   Plantar flexion: 50 degrees  "  Inversion: 33 degrees   Eversion: 20 degrees     Strength/Myotome Testing   Left ankle  DF 5/5  PF 4+/5  Ev 4+/5  Inv 4/5  Great toe flex 4+  Great toe ext 4+    Right Ankle/Foot   Dorsiflexion: 4+  Plantar flexion: 4+  Eversion: 4+  Inversion 4+  Great toe flexion: 4+  Great toe extension: 4+    Additional Strength Details  Not assessed due to lack of full motion     Ambulation     Observational Gait   Gait: Normalized                Plan: Continue per plan of care.   Date 2/16 2/20 2/23 2/29   Visit #   13 14     FOTO   X       Re-eval                    Manuals 2/16 2/20 2/23 2/29   Ankle PROM to tolerance AFB FH Calcaneal mob grade2-3       STM to lateral ankle                                   Neuro Re-Ed           Foam WS           Rocker board A/P, M/L 2x10 ea   2x10 ea     Rocker board static balance A/P, M/L  1 min ea NV       SLS 3x30\" on foam   3x30\" on foam  3x30''   BAPs            Foam steps           Mini squats 2x10 NV 2x10  2x10               Ther Ex 2/16 2/20 2/23 2/29   Bike   TM walking 15 min 4% incline endurance Home     Ankle AROM DF/PF, EV/INV           Ankle ABCs           4 way cristal EV/IV  GTB x20 ea NV    BTB x20   Ankle circles Wobble board all directions x20 ea NV Wobble board all directions x20 ea     Supine SLR           Seated HR/TR Off of half foam 2x10 NV Off of half foam, 5'' 2x10  Off step HR, ground toe raises   Arch raises           Short foot           Standing SLR 3 way  2# 2x10 ea NV 2# 2x10 ea  GTB 2x10   Gastroc stretch         towel 3x30''   Gastroc/soleus stretch on 1/2 foam roll in standing 30''x3  15''x3 on 1/2 foam roll/SB 30''x3  15''x3 on 1/2 foam roll/SB    Add to HEP NV                            CC EV/INV           Ther Activity           Step ups Lat 6\" step overs 2x10 NV 6'' ups/lat      4\" step overs 2x10  6'' ups/lat      4\" step overs 2x10   Squat           Gait Training                                   Modalities                                       "

## 2024-03-01 ENCOUNTER — OFFICE VISIT (OUTPATIENT)
Dept: PHYSICAL THERAPY | Facility: CLINIC | Age: 69
End: 2024-03-01
Payer: MEDICARE

## 2024-03-01 DIAGNOSIS — M25.572 LEFT ANKLE PAIN, UNSPECIFIED CHRONICITY: Primary | ICD-10-CM

## 2024-03-01 PROCEDURE — 97112 NEUROMUSCULAR REEDUCATION: CPT

## 2024-03-01 PROCEDURE — 97110 THERAPEUTIC EXERCISES: CPT

## 2024-03-01 PROCEDURE — 97140 MANUAL THERAPY 1/> REGIONS: CPT

## 2024-03-01 NOTE — PROGRESS NOTES
"Daily Note     Today's date: 3/1/2024  Patient name: Lilian Tamayo  : 1955  MRN: [id number]  Referring provider: Suhail Mckay *  Dx:   Encounter Diagnosis     ICD-10-CM    1. Left ankle pain, unspecified chronicity  M25.572           Start Time: 1245  Stop Time: 1323  Total time in clinic (min): 38 minutes    Subjective: Pt reports no sig discomfort today, only verbalizes some elevation in pain when descending steps quickly onto affected ankle, otherwise pain diminishes. PT edu patient discomfort with this will improve as strength and control continue to get better.     Objective: See treatment diary below      Assessment: Tolerated treatment well. She reports no sx elevation during treatment session. Patient demonstrated fatigue post treatment, exhibited good technique with therapeutic exercises, and would benefit from continued PT      Plan: Continue per plan of care.         Plan: Continue per plan of care.   Date 2/16 2/20 2/23  2/29 3/1   Visit #   13 14      FOTO   X        Re-eval                     Manuals  3   Ankle PROM to tolerance AFB FH Calcaneal mob grade2-3     FH   STM to lateral ankle                                      Neuro Re-Ed            Foam WS            Rocker board A/P, M/L 2x10 ea   2x10 ea   2x10   Rocker board static balance A/P, M/L  1 min ea NV        SLS 3x30\" on foam   3x30\" on foam  3x30'' 3x30''   BAPs             Foam steps            Mini squats 2x10 NV 2x10  2x10 2x10                Ther Ex 2/16 2/20 2/23  2/29 3/1   Bike   TM walking 15 min 4% incline endurance Home      Ankle AROM DF/PF, EV/INV            Ankle ABCs            4 way cristal EV/IV  GTB x20 ea NV    BTB x20    Ankle circles Wobble board all directions x20 ea NV Wobble board all directions x20 ea      Supine SLR            Seated HR/TR Off of half foam 2x10 NV Off of half foam, 5'' 2x10  Off step HR, ground toe raises Off of half foam, 5'' 2x10   Arch raises          " "  Short foot            Standing SLR 3 way  2# 2x10 ea NV 2# 2x10 ea  GTB 2x10 GTB 2x10   Gastroc stretch         towel 3x30''    Gastroc/soleus stretch on 1/2 foam roll in standing 30''x3  15''x3 on 1/2 foam roll/SB 30''x3  15''x3 on 1/2 foam roll/SB    Add to HEP NV  SL slant board 3x30''                             CC EV/INV            Ther Activity            Step ups Lat 6\" step overs 2x10 NV 6'' ups/lat      4\" step overs 2x10  6'' ups/lat      4\" step overs 2x10 6'' ups/lat step overs 2x10   Squat            Gait Training                                      Modalities                                              "

## 2024-03-05 ENCOUNTER — APPOINTMENT (OUTPATIENT)
Dept: PHYSICAL THERAPY | Facility: CLINIC | Age: 69
End: 2024-03-05
Payer: MEDICARE

## 2024-03-27 ENCOUNTER — HOSPITAL ENCOUNTER (OUTPATIENT)
Dept: HOSPITAL 99 - REG | Age: 69
End: 2024-03-27
Payer: MEDICARE

## 2024-03-27 DIAGNOSIS — E78.49: Primary | ICD-10-CM

## 2024-03-27 DIAGNOSIS — R73.9: ICD-10-CM

## 2024-03-27 LAB
ALBUMIN SERPL-MCNC: 4.6 G/DL (ref 3.5–5)
ALP SERPL-CCNC: 74 U/L (ref 38–126)
ALT SERPL-CCNC: 31 U/L (ref 0–35)
AST SERPL-CCNC: 28 U/L (ref 14–36)
BUN SERPL-MCNC: 14 MG/DL (ref 7–17)
CALCIUM SERPL-MCNC: 10.4 MG/DL (ref 8.4–10.2)
CHLORIDE SERPL-SCNC: 101 MMOL/L (ref 98–107)
CO2 SERPL-SCNC: 29 MMOL/L (ref 22–30)
EGFR: > 60
ERYTHROCYTE [DISTWIDTH] IN BLOOD BY AUTOMATED COUNT: 12.6 % (ref 11.5–14.5)
GLUCOSE SERPL-MCNC: 118 MG/DL (ref 70–99)
HBA1C MFR BLD: 6.4 % (ref 4–5.6)
HCT VFR BLD AUTO: 42.2 % (ref 37–47)
HDLC SERPL-MCNC: 75 MG/DL
HGB BLD-MCNC: 13.5 G/DL (ref 12–16)
LDLC SERPL CALC-MCNC: 82 MG/DL
MCHC RBC AUTO-ENTMCNC: 32 G/DL (ref 33–37)
MCV RBC AUTO: 90.8 FL (ref 81–99)
NRBC BLD AUTO-RTO: 0 %
PLATELET # BLD AUTO: 279 10^3/UL (ref 130–400)
POTASSIUM SERPL-SCNC: 4.6 MMOL/L (ref 3.5–5.1)
PROT SERPL-MCNC: 7.3 G/DL (ref 6.3–8.2)
SODIUM SERPL-SCNC: 139 MMOL/L (ref 135–145)
VLDLC SERPL CALC-MCNC: 14 MG/DL (ref 0–30)

## 2024-04-23 ENCOUNTER — HOSPITAL ENCOUNTER (OUTPATIENT)
Dept: HOSPITAL 99 - MRI 3T | Age: 69
End: 2024-04-23
Payer: MEDICARE

## 2024-04-23 DIAGNOSIS — M25.561: Primary | ICD-10-CM

## 2024-07-24 ENCOUNTER — HOSPITAL ENCOUNTER (OUTPATIENT)
Dept: HOSPITAL 99 - HWRAD | Age: 69
End: 2024-07-24
Payer: MEDICARE

## 2024-07-24 DIAGNOSIS — R07.81: Primary | ICD-10-CM

## 2024-09-30 ENCOUNTER — HOSPITAL ENCOUNTER (OUTPATIENT)
Dept: HOSPITAL 99 - HWLAB | Age: 69
End: 2024-09-30
Payer: MEDICARE

## 2024-09-30 DIAGNOSIS — K59.00: Primary | ICD-10-CM

## 2024-11-18 ENCOUNTER — HOSPITAL ENCOUNTER (OUTPATIENT)
Dept: HOSPITAL 99 - REG | Age: 69
End: 2024-11-18
Payer: MEDICARE

## 2024-11-18 DIAGNOSIS — M85.80: ICD-10-CM

## 2024-11-18 DIAGNOSIS — E78.5: Primary | ICD-10-CM

## 2024-11-18 DIAGNOSIS — E55.9: ICD-10-CM

## 2024-11-18 DIAGNOSIS — R73.9: ICD-10-CM

## 2024-11-18 LAB
ALBUMIN SERPL-MCNC: 4.5 G/DL (ref 3.5–5)
ALP SERPL-CCNC: 71 U/L (ref 38–126)
ALT SERPL-CCNC: 41 U/L (ref 0–35)
AST SERPL-CCNC: 38 U/L (ref 14–36)
BUN SERPL-MCNC: 12 MG/DL (ref 7–17)
CALCIUM SERPL-MCNC: 10.1 MG/DL (ref 8.4–10.2)
CHLORIDE SERPL-SCNC: 103 MMOL/L (ref 98–107)
CO2 SERPL-SCNC: 28 MMOL/L (ref 22–30)
EGFR: > 60
GLUCOSE SERPL-MCNC: 117 MG/DL (ref 70–99)
HBA1C MFR BLD: 6 % (ref 4–5.6)
POTASSIUM SERPL-SCNC: 4.6 MMOL/L (ref 3.5–5.1)
PROT SERPL-MCNC: 7.2 G/DL (ref 6.3–8.2)
SODIUM SERPL-SCNC: 143 MMOL/L (ref 135–145)

## 2024-12-19 ENCOUNTER — HOSPITAL ENCOUNTER (OUTPATIENT)
Dept: HOSPITAL 99 - REG | Age: 69
End: 2024-12-19
Payer: MEDICARE

## 2024-12-19 DIAGNOSIS — R73.9: ICD-10-CM

## 2024-12-19 DIAGNOSIS — R14.0: ICD-10-CM

## 2024-12-19 DIAGNOSIS — R79.89: Primary | ICD-10-CM

## 2024-12-19 LAB
ALBUMIN SERPL-MCNC: 4.4 G/DL (ref 3.5–5)
ALP SERPL-CCNC: 70 U/L (ref 38–126)
ALT SERPL-CCNC: 28 U/L (ref 0–35)
AST SERPL-CCNC: 28 U/L (ref 14–36)
BUN SERPL-MCNC: 10 MG/DL (ref 7–17)
CALCIUM SERPL-MCNC: 9.8 MG/DL (ref 8.4–10.2)
CHLORIDE SERPL-SCNC: 103 MMOL/L (ref 98–107)
CO2 SERPL-SCNC: 30 MMOL/L (ref 22–30)
EGFR: > 60
ERYTHROCYTE [DISTWIDTH] IN BLOOD BY AUTOMATED COUNT: 12.6 % (ref 11.5–14.5)
FERRITIN SERPL-MCNC: 16.6 NG/ML (ref 11.1–264)
GLUCOSE SERPL-MCNC: 118 MG/DL (ref 70–99)
HCT VFR BLD AUTO: 40.5 % (ref 37–47)
HGB BLD-MCNC: 13 G/DL (ref 12–16)
IRON SERPL-MCNC: 95 UG/DL (ref 37–170)
MCHC RBC AUTO-ENTMCNC: 32.1 G/DL (ref 33–37)
MCV RBC AUTO: 90.2 FL (ref 81–99)
NRBC BLD AUTO-RTO: 0 %
PLATELET # BLD AUTO: 298 10^3/UL (ref 130–400)
POTASSIUM SERPL-SCNC: 4.7 MMOL/L (ref 3.5–5.1)
PROT SERPL-MCNC: 6.9 G/DL (ref 6.3–8.2)
SODIUM SERPL-SCNC: 138 MMOL/L (ref 135–145)
TIBC SERPL-MCNC: 349 UG/DL (ref 265–497)

## 2025-02-15 ENCOUNTER — HOSPITAL ENCOUNTER (OUTPATIENT)
Dept: HOSPITAL 99 - RCS | Age: 70
End: 2025-02-15
Payer: MEDICARE

## 2025-02-15 DIAGNOSIS — R00.2: Primary | ICD-10-CM

## 2025-02-17 ENCOUNTER — HOSPITAL ENCOUNTER (OUTPATIENT)
Dept: HOSPITAL 99 - RCS | Age: 70
End: 2025-02-17
Payer: MEDICARE

## 2025-02-17 DIAGNOSIS — R07.9: Primary | ICD-10-CM

## 2025-02-17 DIAGNOSIS — R06.09: ICD-10-CM

## 2025-02-17 DIAGNOSIS — R00.2: ICD-10-CM

## 2025-02-17 PROCEDURE — 93017 CV STRESS TEST TRACING ONLY: CPT

## 2025-02-22 ENCOUNTER — HOSPITAL ENCOUNTER (OUTPATIENT)
Dept: HOSPITAL 99 - RCS | Age: 70
End: 2025-02-22
Payer: MEDICARE

## 2025-02-22 DIAGNOSIS — R06.09: ICD-10-CM

## 2025-02-22 DIAGNOSIS — R07.9: Primary | ICD-10-CM

## 2025-02-22 LAB
ALBUMIN SERPL-MCNC: 4.3 G/DL (ref 3.5–5)
ALP SERPL-CCNC: 80 U/L (ref 38–126)
ALT SERPL-CCNC: 37 U/L (ref 0–35)
AST SERPL-CCNC: 30 U/L (ref 14–36)
BUN SERPL-MCNC: 13 MG/DL (ref 7–17)
CALCIUM SERPL-MCNC: 10.1 MG/DL (ref 8.4–10.2)
CHLORIDE SERPL-SCNC: 103 MMOL/L (ref 98–107)
CO2 SERPL-SCNC: 30 MMOL/L (ref 22–30)
EGFR: > 60
ERYTHROCYTE [DISTWIDTH] IN BLOOD BY AUTOMATED COUNT: 13.1 % (ref 11.5–14.5)
GLUCOSE SERPL-MCNC: 125 MG/DL (ref 70–99)
HCT VFR BLD AUTO: 41.9 % (ref 37–47)
HDLC SERPL-MCNC: 95 MG/DL
HGB BLD-MCNC: 13.5 G/DL (ref 12–16)
LDLC SERPL CALC-MCNC: 87 MG/DL
MCHC RBC AUTO-ENTMCNC: 32.2 G/DL (ref 33–37)
MCV RBC AUTO: 89 FL (ref 81–99)
NRBC BLD AUTO-RTO: 0 %
PLATELET # BLD AUTO: 323 10^3/UL (ref 130–400)
POTASSIUM SERPL-SCNC: 5.1 MMOL/L (ref 3.5–5.1)
PROT SERPL-MCNC: 6.9 G/DL (ref 6.3–8.2)
SODIUM SERPL-SCNC: 138 MMOL/L (ref 135–145)
VLDLC SERPL CALC-MCNC: 14 MG/DL (ref 0–30)

## 2025-03-04 ENCOUNTER — HOSPITAL ENCOUNTER (OUTPATIENT)
Dept: HOSPITAL 99 - REG | Age: 70
End: 2025-03-04
Payer: MEDICARE

## 2025-03-04 DIAGNOSIS — N39.0: Primary | ICD-10-CM

## 2025-03-04 LAB
SQUAMOUS URNS QL MICRO: (no result) /LPF
URINE RED BLOOD CELL: (no result) /HPF (ref 0–2)
URINE WHITE CELL: (no result) /HPF (ref 0–5)

## 2025-03-20 ENCOUNTER — HOSPITAL ENCOUNTER (OUTPATIENT)
Dept: HOSPITAL 99 - HWRAD | Age: 70
End: 2025-03-20
Payer: MEDICARE

## 2025-03-20 DIAGNOSIS — R79.89: Primary | ICD-10-CM

## 2025-03-20 DIAGNOSIS — R14.0: ICD-10-CM

## 2025-03-20 DIAGNOSIS — R73.9: ICD-10-CM

## 2025-06-07 ENCOUNTER — HOSPITAL ENCOUNTER (OUTPATIENT)
Dept: HOSPITAL 99 - WDC | Age: 70
End: 2025-06-07
Payer: MEDICARE

## 2025-06-07 DIAGNOSIS — Z12.31: ICD-10-CM

## 2025-06-07 DIAGNOSIS — R92.2: Primary | ICD-10-CM

## 2025-06-30 ENCOUNTER — HOSPITAL ENCOUNTER (OUTPATIENT)
Dept: HOSPITAL 99 - REG | Age: 70
End: 2025-06-30
Payer: MEDICARE

## 2025-06-30 DIAGNOSIS — R73.9: Primary | ICD-10-CM

## 2025-06-30 LAB
ALBUMIN SERPL-MCNC: 4.5 G/DL (ref 3.5–5)
ALP SERPL-CCNC: 65 U/L (ref 38–126)
ALT SERPL-CCNC: 35 U/L (ref 0–35)
AST SERPL-CCNC: 30 U/L (ref 14–36)
BILIRUB SERPL-MCNC: 0.8 MG/DL (ref 0.2–1.3)
BUN SERPL-MCNC: 15 MG/DL (ref 7–17)
CALCIUM SERPL-MCNC: 10.2 MG/DL (ref 8.4–10.2)
CHLORIDE SERPL-SCNC: 106 MMOL/L (ref 98–107)
CO2 SERPL-SCNC: 27 MMOL/L (ref 22–30)
CREAT SERPL-MCNC: 0.7 MG/DL (ref 0.6–1)
EGFR: > 60
ESTIMATED CREATININE CLEARANCE: (no result) ML/MIN
GLUCOSE SERPL-MCNC: 114 MG/DL (ref 70–99)
HBA1C MFR BLD: 6.3 % (ref 4–5.6)
POTASSIUM SERPL-SCNC: 5.3 MMOL/L (ref 3.5–5.1)
PROT SERPL-MCNC: 7.2 G/DL (ref 6.3–8.2)
SODIUM SERPL-SCNC: 141 MMOL/L (ref 135–145)